# Patient Record
Sex: FEMALE | Race: WHITE | URBAN - METROPOLITAN AREA
[De-identification: names, ages, dates, MRNs, and addresses within clinical notes are randomized per-mention and may not be internally consistent; named-entity substitution may affect disease eponyms.]

---

## 2018-08-03 ENCOUNTER — OFFICE VISIT (OUTPATIENT)
Dept: FAMILY MEDICINE | Facility: CLINIC | Age: 63
End: 2018-08-03
Payer: COMMERCIAL

## 2018-08-03 VITALS
SYSTOLIC BLOOD PRESSURE: 141 MMHG | HEIGHT: 61 IN | BODY MASS INDEX: 30.35 KG/M2 | DIASTOLIC BLOOD PRESSURE: 92 MMHG | WEIGHT: 160.75 LBS | HEART RATE: 103 BPM | TEMPERATURE: 98.5 F

## 2018-08-03 DIAGNOSIS — Z12.39 BREAST CANCER SCREENING: ICD-10-CM

## 2018-08-03 DIAGNOSIS — Z12.11 COLON CANCER SCREENING: ICD-10-CM

## 2018-08-03 DIAGNOSIS — Z00.00 PREVENTATIVE HEALTH CARE: ICD-10-CM

## 2018-08-03 DIAGNOSIS — K21.9 GASTROESOPHAGEAL REFLUX DISEASE, ESOPHAGITIS PRESENCE NOT SPECIFIED: ICD-10-CM

## 2018-08-03 DIAGNOSIS — R10.13 ABDOMINAL PAIN, EPIGASTRIC: Primary | ICD-10-CM

## 2018-08-03 DIAGNOSIS — R03.0 ELEVATED BLOOD-PRESSURE READING WITHOUT DIAGNOSIS OF HYPERTENSION: ICD-10-CM

## 2018-08-03 LAB
ALBUMIN SERPL-MCNC: 4.4 G/DL (ref 3.4–5)
ALP SERPL-CCNC: 76 U/L (ref 40–150)
ALT SERPL W P-5'-P-CCNC: 34 U/L (ref 0–50)
ANION GAP SERPL CALCULATED.3IONS-SCNC: 8 MMOL/L (ref 3–14)
AST SERPL W P-5'-P-CCNC: 18 U/L (ref 0–45)
BILIRUB SERPL-MCNC: 0.4 MG/DL (ref 0.2–1.3)
BUN SERPL-MCNC: 12 MG/DL (ref 7–30)
CALCIUM SERPL-MCNC: 9.2 MG/DL (ref 8.5–10.1)
CHLORIDE SERPL-SCNC: 107 MMOL/L (ref 94–109)
CHOLEST SERPL-MCNC: 238 MG/DL
CO2 SERPL-SCNC: 25 MMOL/L (ref 20–32)
CREAT SERPL-MCNC: 0.6 MG/DL (ref 0.52–1.04)
ERYTHROCYTE [DISTWIDTH] IN BLOOD BY AUTOMATED COUNT: 12 % (ref 10–15)
GFR SERPL CREATININE-BSD FRML MDRD: >90 ML/MIN/1.7M2
GLUCOSE SERPL-MCNC: 108 MG/DL (ref 70–99)
HBA1C MFR BLD: 5.8 % (ref 0–5.6)
HCT VFR BLD AUTO: 42.5 % (ref 35–47)
HDLC SERPL-MCNC: 50 MG/DL
HGB BLD-MCNC: 14.7 G/DL (ref 11.7–15.7)
LDLC SERPL CALC-MCNC: 167 MG/DL
MCH RBC QN AUTO: 31.2 PG (ref 26.5–33)
MCHC RBC AUTO-ENTMCNC: 34.6 G/DL (ref 31.5–36.5)
MCV RBC AUTO: 90 FL (ref 78–100)
NONHDLC SERPL-MCNC: 188 MG/DL
PLATELET # BLD AUTO: 436 10E9/L (ref 150–450)
POTASSIUM SERPL-SCNC: 4.1 MMOL/L (ref 3.4–5.3)
PROT SERPL-MCNC: 8.2 G/DL (ref 6.8–8.8)
RBC # BLD AUTO: 4.71 10E12/L (ref 3.8–5.2)
SODIUM SERPL-SCNC: 140 MMOL/L (ref 133–144)
TRIGL SERPL-MCNC: 104 MG/DL
WBC # BLD AUTO: 5.5 10E9/L (ref 4–11)

## 2018-08-03 PROCEDURE — 36415 COLL VENOUS BLD VENIPUNCTURE: CPT | Performed by: FAMILY MEDICINE

## 2018-08-03 PROCEDURE — 85027 COMPLETE CBC AUTOMATED: CPT | Performed by: FAMILY MEDICINE

## 2018-08-03 PROCEDURE — 80061 LIPID PANEL: CPT | Performed by: FAMILY MEDICINE

## 2018-08-03 PROCEDURE — 99203 OFFICE O/P NEW LOW 30 MIN: CPT | Performed by: FAMILY MEDICINE

## 2018-08-03 PROCEDURE — 83036 HEMOGLOBIN GLYCOSYLATED A1C: CPT | Performed by: FAMILY MEDICINE

## 2018-08-03 PROCEDURE — 80053 COMPREHEN METABOLIC PANEL: CPT | Performed by: FAMILY MEDICINE

## 2018-08-03 RX ORDER — ACETAMINOPHEN 325 MG/1
325-650 TABLET ORAL EVERY 6 HOURS PRN
COMMUNITY

## 2018-08-03 RX ORDER — IBUPROFEN 200 MG
200 TABLET ORAL EVERY 4 HOURS PRN
COMMUNITY
End: 2020-10-23

## 2018-08-03 NOTE — PROGRESS NOTES
SUBJECTIVE:   Cynthia Cheatham is a 63 year old female who presents to clinic today for the following health issues:      ABD PAIN     Onset: 3 weeks    Description:   Character: Burning  Location: epigastric region  Radiation: Back    Intensity: 7/10    Progression of Symptoms:  improving and intermittent    Accompanying Signs & Symptoms:  Fever/Chills?: no   Gas/Bloating: YES  Nausea: no   Vomitting: no   Diarrhea?: no   Constipation:no   Dysuria or Hematuria: no    History:   Trauma: no   Previous similar pain: no    Previous tests done: none    Precipitating factors:   Does the pain change with:     Food: YES     BM: no     Urination: no     Alleviating factors:  TUMS    Therapies Tried and outcome: TUMS with minimal relief    LMP:  not applicable       She is a new patient to me and our clinic.  She has not had any health care for herself in years.  She has spent much of the 18 years caring for her parents.  Her father  of Parkinson's disease a while back and her mother just  in April of this year.    The patient has been reluctant to go to doctors for herself.  She has never had a Pap smear.  She is single and has not been sexually active for years.  She has had high blood pressure readings in the past when she has accessed any type of healthcare.  She has never had a mammogram or colonoscopy.  She has been having some burning midepigastric discomfort in the last couple of months or so.  It is worse with caffeine.  She was drinking 1-2 glasses of wine per day, but she has cut that back and her caffeine intake back and that seems to help.  She tried some over-the-counter H2 blockers, but they did not seem to help much.  She thought she felt a lump in the right upper abdomen a week or 2 ago, but she does not feel it now.  Her father has had gallstones.  He has had diabetes.  Both parents had high blood pressure.  Her brother has high blood pressure.    Problem list and histories reviewed & adjusted,  "as indicated.  Additional history: as documented    There is no problem list on file for this patient.    History reviewed. No pertinent surgical history.    Social History   Substance Use Topics     Smoking status: Never Smoker     Smokeless tobacco: Never Used     Alcohol use Yes      Comment: 7-14/ week, wine     History reviewed. No pertinent family history.      Current Outpatient Prescriptions   Medication Sig Dispense Refill     acetaminophen (TYLENOL) 325 MG tablet Take 325-650 mg by mouth every 6 hours as needed for mild pain       ibuprofen (ADVIL/MOTRIN) 200 MG tablet Take 200 mg by mouth every 4 hours as needed for mild pain         1     Allergies   Allergen Reactions     Penicillins        Reviewed and updated as needed this visit by clinical staff  Tobacco  Meds  Med Hx  Surg Hx  Fam Hx  Soc Hx      Reviewed and updated as needed this visit by Provider         ROS:  She admits to some \"binge eating\" at times where she will eat a lot of junk food.  Other times she will try to eat healthy.  She has not had nausea or poor appetite.    OBJECTIVE:     BP (!) 158/102 (BP Location: Right arm, Patient Position: Sitting, Cuff Size: Adult Regular)  Pulse 103  Temp 98.5  F (36.9  C) (Oral)  Ht 5' 0.75\" (1.543 m)  Wt 160 lb 12 oz (72.9 kg)  BMI 30.62 kg/m2  Body mass index is 30.62 kg/(m^2).  GENERAL: alert, no distress and over weight  ABDOMEN: soft, nontender, no hepatosplenomegaly, no masses and bowel sounds normal  PSYCH: mentation appears normal, tearful and anxious    Diagnostic Test Results:  none     ASSESSMENT/PLAN:       ICD-10-CM    1. Abdominal pain, epigastric R10.13 ibuprofen (ADVIL/MOTRIN) 200 MG tablet     acetaminophen (TYLENOL) 325 MG tablet     omeprazole (PRILOSEC) 20 MG CR capsule     Comprehensive metabolic panel   2. Gastroesophageal reflux disease, esophagitis presence not specified K21.9    3. Elevated blood-pressure reading without diagnosis of hypertension R03.0    4. Colon " cancer screening Z12.11 GASTROENTEROLOGY ADULT REF PROCEDURE ONLY Other; [facility] [phone]   5. Breast cancer screening Z12.31 MA Screening Digital Bilateral   6. Preventative health care Z00.00 Hemoglobin A1c     CBC with platelets     Lipid panel reflex to direct LDL Non-fasting     CANCELED: Lipid panel reflex to direct LDL Fasting     CANCELED: GASTROENTEROLOGY ADULT REF PROCEDURE ONLY Other     She has had some midepigastric discomfort which I suspect is related to gastritis and/or GERD  I recommended reduction or abstinence from caffeine and alcoholic beverages, hot spicy foods, etc.  We will have her take omeprazole 20 mg daily  We will check some lab work as above  She is fasting except for having had a small amount of grapefruit juice this morning  We will set her up for some screening tests including mammogram and colonoscopy  We will have her return next week for review of lab results and a general physical including Pap smear  We will readdress her blood pressure at that time and consider medication for that  Discussed possible gallbladder ultrasound if symptoms persist, or perhaps upper endoscopy as well    Albert Grijalva MD  [facility]

## 2018-08-03 NOTE — MR AVS SNAPSHOT
After Visit Summary   8/3/2018    Cynthia Cheatham    MRN: [id number]           Patient Information     Date Of Birth          1955        Visit Information        Provider Department      8/3/2018 10:00 AM Albert Grijalva MD [facility]        Today's Diagnoses     Abdominal pain, epigastric    -  1    Gastroesophageal reflux disease, esophagitis presence not specified        Elevated blood-pressure reading without diagnosis of hypertension        Colon cancer screening        Breast cancer screening        Preventative health care           Follow-ups after your visit        Additional Services     GASTROENTEROLOGY ADULT REF PROCEDURE ONLY Other; [facility] [phone]       Last Lab Result: No results found for: CR  Body mass index is 30.62 kg/(m^2).     Needed:  No  Language:  English    Patient will be contacted to schedule procedure.     Please be aware that coverage of these services is subject to the terms and limitations of your health insurance plan.  Call member services at your health plan with any benefit or coverage questions.  Any procedures must be performed at a [facility] facility OR coordinated by your clinic's referral office.    Please bring the following with you to your appointment:    (1) Any X-Rays, CTs or MRIs which have been performed.  Contact the facility where they were done to arrange for  prior to your scheduled appointment.    (2) List of current medications   (3) This referral request   (4) Any documents/labs given to you for this referral                  Follow-up notes from your care team     Return in about 5 days (around 8/8/2018).      Your next 10 appointments already scheduled     Aug 09, 2018  2:20 PM CDT   PHYSICAL with Albert Grijalva MD   [facility]    [address] [facility] [address] [phone]            Aug 13, 2018 10:00  "AM CDT   MA SCREENING DIGITAL BILATERAL with CPMA1   [facility] ([facility])    [address]  [facility] [address] [phone]           Do not use any powder, lotion or deodorant under your arms or on your breast. If you do, we will ask you to remove it before your exam.  Wear comfortable, two-piece clothing.  If you have any allergies, tell your care team.  Bring any previous mammograms from other facilities or have them mailed to the breast center.              Future tests that were ordered for you today     Open Future Orders        Priority Expected Expires Ordered    MA Screening Digital Bilateral Routine  8/3/2019 8/3/2018            Who to contact     If you have questions or need follow up information about today's clinic visit or your schedule please contact [facility] directly at [phone].  Normal or non-critical lab and imaging results will be communicated to you by MyChart, letter or phone within 4 business days after the clinic has received the results. If you do not hear from us within 7 days, please contact the clinic through ams AGhart or phone. If you have a critical or abnormal lab result, we will notify you by phone as soon as possible.  Submit refill requests through UpNext or call your pharmacy and they will forward the refill request to us. Please allow 3 business days for your refill to be completed.          Additional Information About Your Visit        MyChart Information     UpNext lets you send messages to your doctor, view your test results, renew your prescriptions, schedule appointments and more. To sign up, go to [email or web address] . Click on \"Log in\" on the left side of the screen, which will take you to the Welcome page. Then click on \"Sign up Now\" on the right side of the page.     You will be asked to enter the access code listed below, as well as some personal information. Please " "follow the directions to create your username and password.     Your access code is: [email or web address]  Expires: 2018 10:40 AM     Your access code will  in 90 days. If you need help or a new code, please call your [place] clinic or [phone].        Care EveryWhere ID     This is your Care EveryWhere ID. This could be used by other organizations to access your [place] medical records  [id number]        Your Vitals Were     Pulse Temperature Height BMI (Body Mass Index)          103 98.5  F (36.9  C) (Oral) 5' 0.75\" (1.543 m) 30.62 kg/m2         Blood Pressure from Last 3 Encounters:   18 (!) 158/102    Weight from Last 3 Encounters:   18 160 lb 12 oz (72.9 kg)              We Performed the Following     CBC with platelets     Comprehensive metabolic panel     GASTROENTEROLOGY ADULT REF PROCEDURE ONLY Other; [facility] [phone]     Hemoglobin A1c     Lipid panel reflex to direct LDL Non-fasting          Today's Medication Changes          These changes are accurate as of 8/3/18 10:50 AM.  If you have any questions, ask your nurse or doctor.               Start taking these medicines.        Dose/Directions    omeprazole 20 MG CR capsule   Commonly known as:  priLOSEC   Used for:  Abdominal pain, epigastric   Started by:  Albert Grijalva MD        Dose:  20 mg   Take 1 capsule (20 mg) by mouth daily a half hour before a meal   Quantity:  30 capsule   Refills:  1            Where to get your medicines      These medications were sent to [id number] - [place], [place] - [address] AT [facility] [address], [place] [address]     Phone:  [phone]     omeprazole 20 MG CR capsule                Primary Care Provider Fax #    Physician No Ref-Primary [phone]       No address on file        Equal Access to Services     DENIZ GUERRERO AH: Aureliano Ortega, waaxda luqadaha, qaybta kaalmada adeegyada, moustapha merrill " ash gamingwongrabia cifuentes'aamalachi ah. So [facility] [place] [phone].    ATENCIÓN: Si habla pedro pablo, tiene a garay disposición servicios gratuitos de asistencia lingüística. [facility] [phone].    We comply with applicable federal civil rights laws and [place] laws. We do not discriminate on the basis of race, color, national origin, age, disability, sex, sexual orientation, or gender identity.            Thank you!     Thank you for choosing [facility]  for your care. Our goal is always to provide you with excellent care. Hearing back from our patients is one way we can continue to improve our services. Please take a few minutes to complete the written survey that you may receive in the mail after your visit with us. Thank you!             Your Updated Medication List - Protect others around you: Learn how to safely use, store and throw away your medicines at www.disposemymeds.org.          This list is accurate as of 8/3/18 10:50 AM.  Always use your most recent med list.                   Brand Name Dispense Instructions for use Diagnosis    ibuprofen 200 MG tablet    ADVIL/MOTRIN     Take 200 mg by mouth every 4 hours as needed for mild pain    Abdominal pain, epigastric       omeprazole 20 MG CR capsule    priLOSEC    30 capsule    Take 1 capsule (20 mg) by mouth daily a half hour before a meal    Abdominal pain, epigastric       TYLENOL 325 MG tablet   Generic drug:  acetaminophen      Take 325-650 mg by mouth every 6 hours as needed for mild pain    Abdominal pain, epigastric

## 2018-08-06 ENCOUNTER — TELEPHONE (OUTPATIENT)
Dept: FAMILY MEDICINE | Facility: CLINIC | Age: 63
End: 2018-08-06

## 2018-08-06 NOTE — TELEPHONE ENCOUNTER
Reason for Call:  Other / [facility] has not received the Referral for a colonoscopy    Detailed comments: Patient called and stated that the Referral for a colonoscopy that was placed online has not been received by [facility].  Patient also stated that she spoke to [facility] and they said that the best and soonest way for them to receive it is to have it done again online or phone it in. Please call patient back if further information is required.    Phone Number Patient can be reached at: [phone]    Best Time: Anytime    Can we leave a detailed message on this number? YES    Call taken on 8/6/2018 at 4:25 PM by Ani Keys

## 2018-08-09 ENCOUNTER — RADIANT APPOINTMENT (OUTPATIENT)
Dept: GENERAL RADIOLOGY | Facility: CLINIC | Age: 63
End: 2018-08-09
Attending: FAMILY MEDICINE
Payer: COMMERCIAL

## 2018-08-09 ENCOUNTER — OFFICE VISIT (OUTPATIENT)
Dept: FAMILY MEDICINE | Facility: CLINIC | Age: 63
End: 2018-08-09
Payer: COMMERCIAL

## 2018-08-09 VITALS
HEIGHT: 61 IN | BODY MASS INDEX: 30.4 KG/M2 | OXYGEN SATURATION: 96 % | SYSTOLIC BLOOD PRESSURE: 153 MMHG | TEMPERATURE: 98 F | HEART RATE: 89 BPM | DIASTOLIC BLOOD PRESSURE: 88 MMHG | WEIGHT: 161 LBS

## 2018-08-09 DIAGNOSIS — E78.5 HYPERLIPIDEMIA LDL GOAL <130: ICD-10-CM

## 2018-08-09 DIAGNOSIS — M25.551 HIP PAIN, RIGHT: ICD-10-CM

## 2018-08-09 DIAGNOSIS — R73.01 IMPAIRED FASTING GLUCOSE: ICD-10-CM

## 2018-08-09 DIAGNOSIS — Z12.4 SCREENING FOR MALIGNANT NEOPLASM OF CERVIX: ICD-10-CM

## 2018-08-09 DIAGNOSIS — Z00.00 ROUTINE GENERAL MEDICAL EXAMINATION AT A HEALTH CARE FACILITY: Primary | ICD-10-CM

## 2018-08-09 DIAGNOSIS — I10 HYPERTENSION WITH GOAL BLOOD PRESSURE LESS THAN 140/90: ICD-10-CM

## 2018-08-09 DIAGNOSIS — Z23 NEED FOR PROPHYLACTIC VACCINATION WITH TETANUS-DIPHTHERIA (TD): ICD-10-CM

## 2018-08-09 PROCEDURE — 90715 TDAP VACCINE 7 YRS/> IM: CPT | Performed by: FAMILY MEDICINE

## 2018-08-09 PROCEDURE — 87624 HPV HI-RISK TYP POOLED RSLT: CPT | Performed by: FAMILY MEDICINE

## 2018-08-09 PROCEDURE — 73502 X-RAY EXAM HIP UNI 2-3 VIEWS: CPT | Mod: FY

## 2018-08-09 PROCEDURE — 90471 IMMUNIZATION ADMIN: CPT | Performed by: FAMILY MEDICINE

## 2018-08-09 PROCEDURE — 99396 PREV VISIT EST AGE 40-64: CPT | Mod: 25 | Performed by: FAMILY MEDICINE

## 2018-08-09 PROCEDURE — 88175 CYTOPATH C/V AUTO FLUID REDO: CPT | Performed by: FAMILY MEDICINE

## 2018-08-09 PROCEDURE — 99213 OFFICE O/P EST LOW 20 MIN: CPT | Mod: 25 | Performed by: FAMILY MEDICINE

## 2018-08-09 RX ORDER — AMLODIPINE BESYLATE 5 MG/1
5 TABLET ORAL DAILY
Qty: 30 TABLET | Refills: 1 | Status: SHIPPED | OUTPATIENT
Start: 2018-08-09 | End: 2018-09-13

## 2018-08-09 ASSESSMENT — ENCOUNTER SYMPTOMS
NAUSEA: 0
MYALGIAS: 0
ARTHRALGIAS: 1
CHILLS: 0
DIARRHEA: 0
EYE PAIN: 0
WEAKNESS: 0
JOINT SWELLING: 0
COUGH: 0
DIZZINESS: 0
HEMATURIA: 0
PALPITATIONS: 0
SHORTNESS OF BREATH: 0
PARESTHESIAS: 0
FREQUENCY: 0
HEARTBURN: 1
HEMATOCHEZIA: 0
HEADACHES: 1
DYSURIA: 0
CONSTIPATION: 0
SORE THROAT: 0
BREAST MASS: 0
NERVOUS/ANXIOUS: 0
FEVER: 0
ABDOMINAL PAIN: 0

## 2018-08-09 NOTE — MR AVS SNAPSHOT
After Visit Summary   8/9/2018    Cynthia Cheatham    MRN: [id number]           Patient Information     Date Of Birth          1955        Visit Information        Provider Department      8/9/2018 2:20 PM Albert Grijalva MD [facility]        Today's Diagnoses     Routine general medical examination at a health care facility    -  1    Hip pain, right        Hypertension with goal blood pressure less than 140/90        Impaired fasting glucose        Need for prophylactic vaccination with tetanus-diphtheria (TD)        Hyperlipidemia LDL goal <130        Screening for malignant neoplasm of cervix          Care Instructions      Preventive Health Recommendations  Female Ages 50 - 64    Yearly exam: See your health care provider every year in order to  o Review health changes.   o Discuss preventive care.    o Review your medicines if your doctor has prescribed any.      Get a Pap test every three years (unless you have an abnormal result and your provider advises testing more often).    If you get Pap tests with HPV test, you only need to test every 5 years, unless you have an abnormal result.     You do not need a Pap test if your uterus was removed (hysterectomy) and you have not had cancer.    You should be tested each year for STDs (sexually transmitted diseases) if you're at risk.     Have a mammogram every 1 to 2 years.    Have a colonoscopy at age 50, or have a yearly FIT test (stool test). These exams screen for colon cancer.      Have a cholesterol test every 5 years, or more often if advised.    Have a diabetes test (fasting glucose) every three years. If you are at risk for diabetes, you should have this test more often.     If you are at risk for osteoporosis (brittle bone disease), think about having a bone density scan (DEXA).    Shots: Get a flu shot each year. Get a tetanus shot every 10 years.    Nutrition:     Eat at least 5 servings of fruits and  vegetables each day.    Eat whole-grain bread, whole-wheat pasta and brown rice instead of white grains and rice.    Get adequate Calcium and Vitamin D.     Lifestyle    Exercise at least 150 minutes a week (30 minutes a day, 5 days a week). This will help you control your weight and prevent disease.    Limit alcohol to one drink per day.    No smoking.     Wear sunscreen to prevent skin cancer.     See your dentist every six months for an exam and cleaning.    See your eye doctor every 1 to 2 years.            Follow-ups after your visit        Follow-up notes from your care team     Return in about 4 weeks (around 9/6/2018) for BP Recheck, follow up on tests.      Your next 10 appointments already scheduled     Aug 13, 2018 10:00 AM CDT   MA SCREENING DIGITAL BILATERAL with CPMA1   [facility]    [address] [facility] [address] [phone]           Do not use any powder, lotion or deodorant under your arms or on your breast. If you do, we will ask you to remove it before your exam.  Wear comfortable, two-piece clothing.  If you have any allergies, tell your care team.  Bring any previous mammograms from other facilities or have them mailed to the breast center.              Who to contact     If you have questions or need follow up information about today's clinic visit or your schedule please contact [facility] directly at [phone].  Normal or non-critical lab and imaging results will be communicated to you by MyChart, letter or phone within 4 business days after the clinic has received the results. If you do not hear from us within 7 days, please contact the clinic through MyChart or phone. If you have a critical or abnormal lab result, we will notify you by phone as soon as possible.  Submit refill requests through Holvi or call your pharmacy and they will forward the refill request to us. Please allow  "3 business days for your refill to be completed.          Additional Information About Your Visit        MyChart Information     Growt lets you send messages to your doctor, view your test results, renew your prescriptions, schedule appointments and more. To sign up, go to [email or web address] . Click on \"Log in\" on the left side of the screen, which will take you to the Welcome page. Then click on \"Sign up Now\" on the right side of the page.     You will be asked to enter the access code listed below, as well as some personal information. Please follow the directions to create your username and password.     Your access code is: [email or web address]  Expires: 2018 10:40 AM     Your access code will  in 90 days. If you need help or a new code, please call your [place] clinic or [phone].        Care EveryWhere ID     This is your Care EveryWhere ID. This could be used by other organizations to access your [place] medical records  [id number]        Your Vitals Were     Pulse Temperature Height Pulse Oximetry BMI (Body Mass Index)       89 98  F (36.7  C) (Oral) 5' 0.5\" (1.537 m) 96% 30.93 kg/m2        Blood Pressure from Last 3 Encounters:   18 153/88   18 (!) 141/92    Weight from Last 3 Encounters:   18 161 lb (73 kg)   18 160 lb 12 oz (72.9 kg)              We Performed the Following          ADMIN VACCINE, FIRST     HPV High Risk Types DNA Cervical     Pap imaged thin layer diagnostic with HPV (select HPV order below)     TDAP VACCINE (ADACEL)     XR Hip Right 2-3 Views          Today's Medication Changes          These changes are accurate as of 18  3:18 PM.  If you have any questions, ask your nurse or doctor.               Start taking these medicines.        Dose/Directions    amLODIPine 5 MG tablet   Commonly known as:  NORVASC   Used for:  Hypertension with goal blood pressure less than 140/90   Started by:  Albert Grijalva MD        Dose:  5 mg   Take 1 tablet " (5 mg) by mouth daily   Quantity:  30 tablet   Refills:  1            Where to get your medicines      These medications were sent to [id number] - [place], [place] - [address] [facility] [facility] [facility] [address], [place] [address]     Phone:  [phone]     amLODIPine 5 MG tablet                Primary Care Provider Fax #    Physician No Ref-Primary [phone]       No address on file        Equal Access to Services     DENIZ GUERRERO : Hadii aad ku hadasho Soomaali, waaxda luqadaha, qaybta kaalmada adeegyada, waxay idiin hayaan adeeg susana garcia. So [facility] [phone].    ATENCIÓN: Si habla español, tiene a garay disposición servicios gratuitos de asistencia lingüística. [facility] [phone].    We comply with applicable federal civil rights laws and [place] laws. We do not discriminate on the basis of race, color, national origin, age, disability, sex, sexual orientation, or gender identity.            Thank you!     Thank you for choosing [facility]  for your care. Our goal is always to provide you with excellent care. Hearing back from our patients is one way we can continue to improve our services. Please take a few minutes to complete the written survey that you may receive in the mail after your visit with us. Thank you!             Your Updated Medication List - Protect others around you: Learn how to safely use, store and throw away your medicines at www.disposemymeds.org.          This list is accurate as of 8/9/18  3:18 PM.  Always use your most recent med list.                   Brand Name Dispense Instructions for use Diagnosis    amLODIPine 5 MG tablet    NORVASC    30 tablet    Take 1 tablet (5 mg) by mouth daily    Hypertension with goal blood pressure less than 140/90       ibuprofen 200 MG tablet    ADVIL/MOTRIN     Take 200 mg by mouth every 4 hours as needed for mild pain    Abdominal pain, epigastric        omeprazole 20 MG CR capsule    priLOSEC    30 capsule    Take 1 capsule (20 mg) by mouth daily a half hour before a meal    Abdominal pain, epigastric       TYLENOL 325 MG tablet   Generic drug:  acetaminophen      Take 325-650 mg by mouth every 6 hours as needed for mild pain    Abdominal pain, epigastric

## 2018-08-09 NOTE — LETTER
August 17, 2018    Cynthia Cheatham  [address] [facility] [address]    Dear Cynthia,  We are happy to inform you that your PAP smear result from 8/9/18 is normal.  We are now able to do a follow up test on PAP smears. The DNA test is for HPV (Human Papilloma Virus). Cervical cancer is closely linked with certain types of HPV. Your results showed no evidence of high risk HPV.  Therefore we recommend you return in 5 years for your next pap smear and HPV test.  You will still need to return to the clinic every year for an annual exam and other preventive tests.  Please contact the clinic at [phone] with any questions.  Sincerely,    Albert Grijalva MD/tatiana

## 2018-08-09 NOTE — NURSING NOTE
Screening Questionnaire for Adult Immunization    Are you sick today?   No   Do you have allergies to medications, food, a vaccine component or latex?   No   Have you ever had a serious reaction after receiving a vaccination?   No   Do you have a long-term health problem with heart disease, lung disease, asthma, kidney disease, metabolic disease (e.g. diabetes), anemia, or other blood disorder?   No   Do you have cancer, leukemia, HIV/AIDS, or any other immune system problem?   No   In the past 3 months, have you taken medications that affect  your immune system, such as prednisone, other steroids, or anticancer drugs; drugs for the treatment of rheumatoid arthritis, Crohn s disease, or psoriasis; or have you had radiation treatments?   No   Have you had a seizure, or a brain or other nervous system problem?   No   During the past year, have you received a transfusion of blood or blood     products, or been given immune (gamma) globulin or antiviral drug?   No   For women: Are you pregnant or is there a chance you could become        pregnant during the next month?   No   Have you received any vaccinations in the past 4 weeks?   No     Immunization questionnaire answers were all negative.        Patient instructed to remain in clinic for 15 minutes afterwards, and to report any adverse reaction to me immediately.  VIS for Tdap given on same date of administration.         Screening performed by Julieth Pineda on 8/9/2018 at 3:22 PM.

## 2018-08-09 NOTE — PROGRESS NOTES
SUBJECTIVE:   CC: Cynthia Cheatham is an 63 year old woman who presents for a preventive health visit and to address some underlying health conditions.  I had just seen her last week, which was the first healthcare visit she had had in a long time.  See previous note for details on this.  She does have a colonoscopy set up for later this month on August 31.  She has a mammogram set up for this coming Monday, August 13.    Physical   Annual:     Getting at least 3 servings of Calcium per day:  Yes    Bi-annual eye exam:  NO    Dental care twice a year:  NO    Sleep apnea or symptoms of sleep apnea:  Daytime drowsiness    Diet:  Regular (no restrictions)    Frequency of exercise:  None    Taking medications regularly:  Yes    Medication side effects:  Not applicable    Additional concerns today:  YES    Other concerns:     Joint or Musculoskeletal Pain  Duration of complaint: Right hip, hasn't been able to walk for awhile. Some days it feels like it is out of joint.  She has had right hip pain for years.  No specific injury.  She used to walk a fair amount, but has not been doing that in the last year or 2 because of the right hip pain.    She does feel like her abdominal discomfort is somewhat better. She is taking omeprazole now.    Today's PHQ-2 Score:   PHQ-2 ( 1999 Pfizer) 8/9/2018   Q1: Little interest or pleasure in doing things 0   Q2: Feeling down, depressed or hopeless 0   PHQ-2 Score 0   Q1: Little interest or pleasure in doing things Not at all   Q2: Feeling down, depressed or hopeless Not at all   PHQ-2 Score 0       Abuse: Current or Past(Physical, Sexual or Emotional)- No  Do you feel safe in your environment - Yes    Social History   Substance Use Topics     Smoking status: Never Smoker     Smokeless tobacco: Never Used     Alcohol use Yes      Comment: 7-14/ week, wine     Alcohol Use 8/9/2018   If you drink alcohol do you typically have greater than 3 drinks per day OR greater than 7 drinks per  week? No       Reviewed orders with patient.  Reviewed health maintenance and updated orders accordingly - Yes  Patient Active Problem List   Diagnosis     Hyperlipidemia LDL goal <130     Impaired fasting glucose     History reviewed. No pertinent surgical history.    Social History   Substance Use Topics     Smoking status: Never Smoker     Smokeless tobacco: Never Used     Alcohol use Yes      Comment: 7-14/ week, wine     Family History   Problem Relation Age of Onset     Prostate Cancer Father      Hyperlipidemia Brother      Prostate Cancer Brother          Current Outpatient Prescriptions   Medication Sig Dispense Refill     acetaminophen (TYLENOL) 325 MG tablet Take 325-650 mg by mouth every 6 hours as needed for mild pain       ibuprofen (ADVIL/MOTRIN) 200 MG tablet Take 200 mg by mouth every 4 hours as needed for mild pain       omeprazole (PRILOSEC) 20 MG CR capsule Take 1 capsule (20 mg) by mouth daily a half hour before a meal 30 capsule 1     Allergies   Allergen Reactions     Penicillins        Patient over age 50, mutual decision to screen reflected in health maintenance.    Pertinent mammograms are reviewed under the imaging tab.  History of abnormal Pap smear: NO - age 30-65 PAP every 5 years with negative HPV co-testing recommended     Reviewed and updated as needed this visit by clinical staff  Tobacco  Allergies  Meds  Med Hx  Surg Hx  Fam Hx  Soc Hx        Reviewed and updated as needed this visit by Provider            Review of Systems   Constitutional: Negative for chills and fever.   HENT: Negative for congestion, ear pain, hearing loss and sore throat.    Eyes: Negative for pain and visual disturbance.   Respiratory: Negative for cough and shortness of breath.    Cardiovascular: Negative for chest pain, palpitations and peripheral edema.   Gastrointestinal: Positive for heartburn. Negative for abdominal pain, constipation, diarrhea, hematochezia and nausea.   Breasts:  Negative for  "tenderness, breast mass and discharge.   Genitourinary: Negative for dysuria, frequency, genital sores, hematuria, pelvic pain, urgency, vaginal bleeding and vaginal discharge.   Musculoskeletal: Positive for arthralgias. Negative for joint swelling and myalgias.   Skin: Negative for rash.   Neurological: Positive for headaches. Negative for dizziness, weakness and paresthesias.   Psychiatric/Behavioral: Negative for mood changes. The patient is not nervous/anxious.           OBJECTIVE:   /88 (BP Location: Left arm, Patient Position: Chair, Cuff Size: Adult Regular)  Pulse 89  Temp 98  F (36.7  C) (Oral)  Ht 5' 0.5\" (1.537 m)  Wt 161 lb (73 kg)  SpO2 96%  BMI 30.93 kg/m2  Physical Exam  GENERAL: alert, no distress and over weight  EYES: Eyes grossly normal to inspection, PERRL and conjunctivae and sclerae normal  HENT: ear canals and TM's normal, nose and mouth without ulcers or lesions  NECK: no adenopathy, no asymmetry, masses, or scars and thyroid normal to palpation  RESP: lungs clear to auscultation - no rales, rhonchi or wheezes  BREAST: normal without masses, tenderness or nipple discharge and no palpable axillary masses or adenopathy  CV: regular rate and rhythm, normal S1 S2, no S3 or S4, no murmur, click or rub, no peripheral edema and peripheral pulses strong  ABDOMEN: soft, nontender, no hepatosplenomegaly, no masses    (female): normal female external genitalia, normal urethral meatus, vaginal mucosa pink, moist, well rugated, and normal cervix/adnexa/uterus without masses or discharge  MS: She has no specific pain to palpation over the lateral right hip or the posterior right hip.  She does have pain in the hip joint with range of motion of the right hip, especially internal rotation and external rotation.  SKIN: no suspicious lesions or rashes  NEURO: Normal strength and tone, mentation intact and speech normal  PSYCH: mentation appears normal, affect normal/bright    Diagnostic Test " Results:  Office Visit on 08/03/2018   Component Date Value Ref Range Status     Sodium 08/03/2018 140  133 - 144 mmol/L Final     Potassium 08/03/2018 4.1  3.4 - 5.3 mmol/L Final     Chloride 08/03/2018 107  94 - 109 mmol/L Final     Carbon Dioxide 08/03/2018 25  20 - 32 mmol/L Final     Anion Gap 08/03/2018 8  3 - 14 mmol/L Final     Glucose 08/03/2018 108* 70 - 99 mg/dL Final    Non Fasting     Urea Nitrogen 08/03/2018 12  7 - 30 mg/dL Final     Creatinine 08/03/2018 0.60  0.52 - 1.04 mg/dL Final     GFR Estimate 08/03/2018 >90  >60 mL/min/1.7m2 Final    Non  GFR Calc     GFR Estimate If Black 08/03/2018 >90  >60 mL/min/1.7m2 Final    African American GFR Calc     Calcium 08/03/2018 9.2  8.5 - 10.1 mg/dL Final     Bilirubin Total 08/03/2018 0.4  0.2 - 1.3 mg/dL Final     Albumin 08/03/2018 4.4  3.4 - 5.0 g/dL Final     Protein Total 08/03/2018 8.2  6.8 - 8.8 g/dL Final     Alkaline Phosphatase 08/03/2018 76  40 - 150 U/L Final     ALT 08/03/2018 34  0 - 50 U/L Final     AST 08/03/2018 18  0 - 45 U/L Final     Hemoglobin A1C 08/03/2018 5.8* 0 - 5.6 % Final    Comment: Normal <5.7% Prediabetes 5.7-6.4%  Diabetes 6.5% or higher - adopted from ADA   consensus guidelines.       WBC 08/03/2018 5.5  4.0 - 11.0 10e9/L Final     RBC Count 08/03/2018 4.71  3.8 - 5.2 10e12/L Final     Hemoglobin 08/03/2018 14.7  11.7 - 15.7 g/dL Final     Hematocrit 08/03/2018 42.5  35.0 - 47.0 % Final     MCV 08/03/2018 90  78 - 100 fl Final     MCH 08/03/2018 31.2  26.5 - 33.0 pg Final     MCHC 08/03/2018 34.6  31.5 - 36.5 g/dL Final     RDW 08/03/2018 12.0  10.0 - 15.0 % Final     Platelet Count 08/03/2018 436  150 - 450 10e9/L Final     Cholesterol 08/03/2018 238* <200 mg/dL Final    Desirable:       <200 mg/dl     Triglycerides 08/03/2018 104  <150 mg/dL Final    Non Fasting     HDL Cholesterol 08/03/2018 50  >49 mg/dL Final     LDL Cholesterol Calculated 08/03/2018 167* <100 mg/dL Final    Comment: Above desirable:   100-129 mg/dl  Borderline High:  130-159 mg/dL  High:             160-189 mg/dL  Very high:       >189 mg/dl       Non HDL Cholesterol 08/03/2018 188* <130 mg/dL Final    Comment: Above Desirable:  130-159 mg/dl  Borderline high:  160-189 mg/dl  High:             190-219 mg/dl  Very high:       >219 mg/dl       The 10-year ASCVD risk score (Dima GUILLAUME Jr, et al., 2013) is: 6.2%    Values used to calculate the score:      Age: 63 years      Sex: Female      Is Non- : No      Diabetic: No      Tobacco smoker: No      Systolic Blood Pressure: 153 mmHg      Is BP treated: No      HDL Cholesterol: 50 mg/dL      Total Cholesterol: 238 mg/dL      ASSESSMENT/PLAN:       ICD-10-CM    1. Routine general medical examination at a health care facility Z00.00    2. Hip pain, right M25.551 XR Hip Right 2-3 Views   3. Hypertension with goal blood pressure less than 140/90 I10 amLODIPine (NORVASC) 5 MG tablet   4. Impaired fasting glucose R73.01    5. Need for prophylactic vaccination with tetanus-diphtheria (TD) Z23 TDAP VACCINE (ADACEL)          ADMIN VACCINE, FIRST   6. Hyperlipidemia LDL goal <130 E78.5    7. Screening for malignant neoplasm of cervix Z12.4 Pap imaged thin layer diagnostic with HPV (select HPV order below)     HPV High Risk Types DNA Cervical     We discussed the above items   Her blood pressure remains elevated, so we will start her on amlodipine 5 mg daily for that  I encouraged her on diet and exercise treatment for her lipids and glucose and blood pressure  She likely has right hip osteoarthritis  We will check an x-ray for her today on her way out and then review those results with her at her next visit  I advised her to stay on the omeprazole for a month  Follow through with mammogram and colonoscopy as scheduled  We will give her a tetanus booster today  We talked about the new shingles vaccine, but decided to hold on that for now  Plan a recheck in about 4 weeks on the blood  "pressure, abdominal pain, and right hip    COUNSELING:  Reviewed preventive health counseling, as reflected in patient instructions       Regular exercise       Healthy diet/nutrition       Immunizations    Vaccinated for: TDAP          BP Readings from Last 1 Encounters:   08/03/18 (!) 141/92     Estimated body mass index is 30.62 kg/(m^2) as calculated from the following:    Height as of 8/3/18: 5' 0.75\" (1.543 m).    Weight as of 8/3/18: 160 lb 12 oz (72.9 kg).           reports that she has never smoked. She has never used smokeless tobacco.      Counseling Resources:  ATP IV Guidelines  Pooled Cohorts Equation Calculator  Breast Cancer Risk Calculator  FRAX Risk Assessment  ICSI Preventive Guidelines  Dietary Guidelines for Americans, 2010  USDA's MyPlate  ASA Prophylaxis  Lung CA Screening    Albert Grijalva MD  [facility]      Answers for HPI/ROS submitted by the patient on 8/9/2018   PHQ-2 Score: 0    "

## 2018-08-13 ENCOUNTER — RADIANT APPOINTMENT (OUTPATIENT)
Dept: MAMMOGRAPHY | Facility: CLINIC | Age: 63
End: 2018-08-13
Payer: COMMERCIAL

## 2018-08-13 DIAGNOSIS — Z12.39 BREAST CANCER SCREENING: ICD-10-CM

## 2018-08-13 LAB
COPATH REPORT: NORMAL
PAP: NORMAL

## 2018-08-13 PROCEDURE — 77067 SCR MAMMO BI INCL CAD: CPT | Mod: TC

## 2018-08-15 LAB
FINAL DIAGNOSIS: NORMAL
HPV HR 12 DNA CVX QL NAA+PROBE: NEGATIVE
HPV16 DNA SPEC QL NAA+PROBE: NEGATIVE
HPV18 DNA SPEC QL NAA+PROBE: NEGATIVE
SPECIMEN DESCRIPTION: NORMAL
SPECIMEN SOURCE CVX/VAG CYTO: NORMAL

## 2018-08-31 ENCOUNTER — TRANSFERRED RECORDS (OUTPATIENT)
Dept: HEALTH INFORMATION MANAGEMENT | Facility: CLINIC | Age: 63
End: 2018-08-31

## 2018-09-13 ENCOUNTER — OFFICE VISIT (OUTPATIENT)
Dept: FAMILY MEDICINE | Facility: CLINIC | Age: 63
End: 2018-09-13
Payer: COMMERCIAL

## 2018-09-13 VITALS
WEIGHT: 159 LBS | SYSTOLIC BLOOD PRESSURE: 144 MMHG | DIASTOLIC BLOOD PRESSURE: 85 MMHG | BODY MASS INDEX: 30.54 KG/M2 | OXYGEN SATURATION: 97 % | TEMPERATURE: 98.6 F | HEART RATE: 80 BPM

## 2018-09-13 DIAGNOSIS — M16.11 PRIMARY OSTEOARTHRITIS OF RIGHT HIP: ICD-10-CM

## 2018-09-13 DIAGNOSIS — R10.13 ABDOMINAL PAIN, EPIGASTRIC: ICD-10-CM

## 2018-09-13 DIAGNOSIS — I10 HYPERTENSION WITH GOAL BLOOD PRESSURE LESS THAN 140/90: Primary | ICD-10-CM

## 2018-09-13 PROCEDURE — 99214 OFFICE O/P EST MOD 30 MIN: CPT | Performed by: FAMILY MEDICINE

## 2018-09-13 RX ORDER — AMLODIPINE BESYLATE 5 MG/1
5 TABLET ORAL DAILY
Qty: 90 TABLET | Refills: 1 | Status: SHIPPED | OUTPATIENT
Start: 2018-09-13 | End: 2019-07-03

## 2018-09-13 NOTE — PROGRESS NOTES
SUBJECTIVE:   Cynthia Cheatham is a 63 year old female who presents to clinic today for the following health issues:      Hypertension Follow-up      Outpatient blood pressures are not being checked.    Low Salt Diet: no added salt      Amount of exercise or physical activity: None    Problems taking medications regularly: No    Medication side effects: Skipping of heartbeat, stopped the omeprazole everyday    Diet: regular (no restrictions)      Xray of hip, colonoscopy.    She has been on the amlodipine now for about a month.  She seems to be tolerating it well.  She gets occasional ankle swelling, but is not very bothersome.  Her abdominal discomfort is much improved.  She is not taking the omeprazole routinely anymore.  She does still have some ongoing right hip pain.  Some days are worse than others.  She generally takes over-the-counter Advil and/or acetaminophen for that.  She also underwent her colonoscopy and had a normal result.  There is some question about when her next colonoscopy should be.  She states that her brother had some polyps so apparently she was advised to have a repeat exam in 5 years.  The patient states that her brother was told that he could have a repeat exam in 10 years, however, so I wonder if those polyps may have been hyperplastic rather than adenomatous.    Problem list and histories reviewed & adjusted, as indicated.  Additional history: as documented    Patient Active Problem List   Diagnosis     Hyperlipidemia LDL goal <130     Impaired fasting glucose     Hypertension with goal blood pressure less than 140/90     Primary osteoarthritis of right hip     History reviewed. No pertinent surgical history.    Social History   Substance Use Topics     Smoking status: Never Smoker     Smokeless tobacco: Never Used     Alcohol use Yes      Comment: 7-14/ week, wine     Family History   Problem Relation Age of Onset     Prostate Cancer Father      Hyperlipidemia Brother      Prostate  Cancer Brother          Current Outpatient Prescriptions   Medication Sig Dispense Refill     acetaminophen (TYLENOL) 325 MG tablet Take 325-650 mg by mouth every 6 hours as needed for mild pain       amLODIPine (NORVASC) 5 MG tablet Take 1 tablet (5 mg) by mouth daily 90 tablet 1     ibuprofen (ADVIL/MOTRIN) 200 MG tablet Take 200 mg by mouth every 4 hours as needed for mild pain       omeprazole (PRILOSEC) 20 MG CR capsule Take 1 capsule (20 mg) by mouth daily a half hour before a meal (Patient taking differently: Take 20 mg by mouth daily a half hour before a meal/Taking PRN) 30 capsule 1     [DISCONTINUED] amLODIPine (NORVASC) 5 MG tablet Take 1 tablet (5 mg) by mouth daily 30 tablet 1     Allergies   Allergen Reactions     Penicillins        Reviewed and updated as needed this visit by clinical staff  Tobacco  Allergies  Meds  Med Hx  Surg Hx  Fam Hx  Soc Hx      Reviewed and updated as needed this visit by Provider         ROS:  Constitutional, HEENT, cardiovascular, pulmonary, gi and gu systems are negative, except as otherwise noted.    OBJECTIVE:     /85 (BP Location: Left arm, Patient Position: Chair, Cuff Size: Adult Regular)  Pulse 80  Temp 98.6  F (37  C) (Oral)  Wt 159 lb (72.1 kg)  SpO2 97%  BMI 30.54 kg/m2  Body mass index is 30.54 kg/(m^2).  GENERAL: healthy, alert and no distress  MS: Pain in the right hip with range of motion    Diagnostic Test Results:  Results for orders placed or performed in visit on 08/13/18   MA Screening Digital Bilateral    Narrative    SCREENING MAMMOGRAM, BILATERAL, DIGITAL w/CAD - 8/13/2018 10:05 AM    BREAST SYMPTOMS: No current breast complaints.     COMPARISON:  Baseline.    BREAST DENSITY: Scattered fibroglandular densities.    COMMENTS: No findings of suspicion for malignancy.       Impression    IMPRESSION: BI-RADS CATEGORY: 1 -  Negative    RECOMMENDED FOLLOW-UP: Annual Mammography.    Exam results letter mailed to patient.      KAYLEY KENNEDY MD      Right hip x-ray results were reviewed which show severe bone-on-bone arthritis.  Colonoscopy report was reviewed which showed a normal colonoscopy.    ASSESSMENT/PLAN:       ICD-10-CM    1. Hypertension with goal blood pressure less than 140/90 I10 amLODIPine (NORVASC) 5 MG tablet   2. Primary osteoarthritis of right hip M16.11    3. Abdominal pain, epigastric R10.13      Blood pressure is somewhat better, but still not optimal  We will keep her on the same amlodipine for now  She will plan to get a home blood pressure cuff and keep track of her home blood pressure readings over the next few months and then return for recheck    Discussed her severe right hip osteoarthritis  She will likely need joint replacement in the near future  She wants to hold on that for now  She will continue with over-the-counter analgesics  Discussed possible cortisone injection for her    Her abdominal pain is better, so she will just use the omeprazole intermittently  I told her to try to get by with acetaminophen over the ibuprofen if possible    She will try to clarify what kind of polyps her brother had    Plan a recheck on hypertension in about 2-3 months    Albert Grijalva MD  [facility]

## 2018-09-13 NOTE — MR AVS SNAPSHOT
"              After Visit Summary   9/13/2018    Cynthia Cheatham    MRN: [id number]           Patient Information     Date Of Birth          1955        Visit Information        Provider Department      9/13/2018 3:40 PM Albert Grijalva MD [facility]        Today's Diagnoses     Hypertension with goal blood pressure less than 140/90    -  1    Primary osteoarthritis of right hip        Abdominal pain, epigastric           Follow-ups after your visit        Follow-up notes from your care team     Return in about 3 months (around 12/13/2018) for BP Recheck.      Who to contact     If you have questions or need follow up information about today's clinic visit or your schedule please contact [facility] directly at [phone].  Normal or non-critical lab and imaging results will be communicated to you by [employer], letter or phone within 4 business days after the clinic has received the results. If you do not hear from us within 7 days, please contact the clinic through [employer] or phone. If you have a critical or abnormal lab result, we will notify you by phone as soon as possible.  Submit refill requests through [employer] or call your pharmacy and they will forward the refill request to us. Please allow 3 business days for your refill to be completed.          Additional Information About Your Visit        MyChart Information     [employer] lets you send messages to your doctor, view your test results, renew your prescriptions, schedule appointments and more. To sign up, go to [email or web address] . Click on \"Log in\" on the left side of the screen, which will take you to the Welcome page. Then click on \"Sign up Now\" on the right side of the page.     You will be asked to enter the access code listed below, as well as some personal information. Please follow the directions to create your username and password.     Your access code is: [email or web address]  Expires: 11/1/2018 " 10:40 AM     Your access code will  in 90 days. If you need help or a new code, please call your [place] clinic or [phone].        Care EveryWhere ID     This is your Care EveryWhere ID. This could be used by other organizations to access your [place] medical records  [id number]        Your Vitals Were     Pulse Temperature Pulse Oximetry BMI (Body Mass Index)          80 98.6  F (37  C) (Oral) 97% 30.54 kg/m2         Blood Pressure from Last 3 Encounters:   18 142/89   18 153/88   18 (!) 141/92    Weight from Last 3 Encounters:   18 159 lb (72.1 kg)   18 161 lb (73 kg)   18 160 lb 12 oz (72.9 kg)              Today, you had the following     No orders found for display         Today's Medication Changes          These changes are accurate as of 18  4:23 PM.  If you have any questions, ask your nurse or doctor.               These medicines have changed or have updated prescriptions.        Dose/Directions    omeprazole 20 MG CR capsule   Commonly known as:  priLOSEC   This may have changed:  additional instructions   Used for:  Abdominal pain, epigastric        Dose:  20 mg   Take 1 capsule (20 mg) by mouth daily a half hour before a meal   Quantity:  30 capsule   Refills:  1            Where to get your medicines      These medications were sent to [id number] - [place], [place] - [address] AT [facility] [address]  [address], [place] [address]     Phone:  [phone]     amLODIPine 5 MG tablet                Primary Care Provider Fax #    Physician No Ref-Primary [phone]       No address on file        Equal Access to Services     [facility] : Hadii bart bradley Sosantos, waaxda luqadaha, qaybta kaalmada adeegyada, moustapha garcia. So [facility] [phone].    ATENCIÓN: Si habla español, tiene a garay disposición servicios gratuitos de asistencia lingüística. Llame al  [phone].    We comply with applicable federal civil rights laws and [place] laws. We do not discriminate on the basis of race, color, national origin, age, disability, sex, sexual orientation, or gender identity.            Thank you!     Thank you for choosing [facility]  for your care. Our goal is always to provide you with excellent care. Hearing back from our patients is one way we can continue to improve our services. Please take a few minutes to complete the written survey that you may receive in the mail after your visit with us. Thank you!             Your Updated Medication List - Protect others around you: Learn how to safely use, store and throw away your medicines at www.disposemymeds.org.          This list is accurate as of 9/13/18  4:23 PM.  Always use your most recent med list.                   Brand Name Dispense Instructions for use Diagnosis    amLODIPine 5 MG tablet    NORVASC    90 tablet    Take 1 tablet (5 mg) by mouth daily    Hypertension with goal blood pressure less than 140/90       ibuprofen 200 MG tablet    ADVIL/MOTRIN     Take 200 mg by mouth every 4 hours as needed for mild pain    Abdominal pain, epigastric       omeprazole 20 MG CR capsule    priLOSEC    30 capsule    Take 1 capsule (20 mg) by mouth daily a half hour before a meal    Abdominal pain, epigastric       TYLENOL 325 MG tablet   Generic drug:  acetaminophen      Take 325-650 mg by mouth every 6 hours as needed for mild pain    Abdominal pain, epigastric

## 2018-10-04 DIAGNOSIS — I10 HYPERTENSION WITH GOAL BLOOD PRESSURE LESS THAN 140/90: ICD-10-CM

## 2018-10-04 NOTE — TELEPHONE ENCOUNTER
"Requested Prescriptions   Pending Prescriptions Disp Refills     amLODIPine (NORVASC) 5 MG tablet [Pharmacy Med Name: AMLODIPINE BESYLATE 5MG TABLETS] 30 tablet 0    Last Written Prescription Date:  9/13/18  Last Fill Quantity: 90,  # refills: 1   Last office visit: 9/13/2018 with prescribing provider:     Future Office Visit:     Sig: TAKE 1 TABLET(5 MG) BY MOUTH DAILY    Calcium Channel Blockers Protocol  Failed    10/4/2018  5:35 PM       Failed - Blood pressure under 140/90 in past 12 months    BP Readings from Last 3 Encounters:   09/13/18 144/85   08/09/18 153/88   08/03/18 (!) 141/92                Passed - Recent (12 mo) or future (30 days) visit within the authorizing provider's specialty    Patient had office visit in the last 12 months or has a visit in the next 30 days with authorizing provider or within the authorizing provider's specialty.  See \"Patient Info\" tab in inbasket, or \"Choose Columns\" in Meds & Orders section of the refill encounter.           Passed - Patient is age 18 or older       Passed - No active pregnancy on record       Passed - Normal serum creatinine on file in past 12 months    Recent Labs   Lab Test  08/03/18   1055   CR  0.60            Passed - No positive pregnancy test in past 12 months          "

## 2018-10-05 RX ORDER — AMLODIPINE BESYLATE 5 MG/1
TABLET ORAL
Qty: 90 TABLET | Refills: 0 | Status: SHIPPED | OUTPATIENT
Start: 2018-10-05 | End: 2019-09-13

## 2018-10-05 NOTE — TELEPHONE ENCOUNTER
Routing refill request to provider for review/approval because:  Labs out of range:  BP out of range  Please advise.  Thank you.  [place] RN

## 2019-05-16 ENCOUNTER — TELEPHONE (OUTPATIENT)
Dept: FAMILY MEDICINE | Facility: CLINIC | Age: 64
End: 2019-05-16

## 2019-05-16 NOTE — LETTER
May 16, 2019    Cynthia Cheatham  [address] [facility] [address]    Dear Cynthia,    We care about your health and have reviewed your health plan. You are in particular need of attention regarding:  - Your High Blood Pressure, Please call to schedule an appointment with your provider or nurse    Here is a list of other Health Maintenance topics that are due now or due soon:  Health Maintenance Due   Topic Date Due     HIV SCREEN (SYSTEM ASSIGNED)  05/19/1975     HEPATITIS C SCREENING  05/19/1975     ZOSTER IMMUNIZATION (1 of 2) 05/19/2007     PHQ-2  01/01/2019       Please call us at [phone], or use [employer] to address the above recommendations.     Thank you for trusting [facility] with your healthcare needs.     Healthy Regards,    Your Care Team    (Team 1)

## 2019-07-03 DIAGNOSIS — I10 HYPERTENSION WITH GOAL BLOOD PRESSURE LESS THAN 140/90: ICD-10-CM

## 2019-07-03 NOTE — TELEPHONE ENCOUNTER
"Requested Prescriptions   Pending Prescriptions Disp Refills     amLODIPine (NORVASC) 5 MG tablet [Pharmacy Med Name: AMLODIPINE BESYLATE 5MG TABLETS] 90 tablet 0     Sig: TAKE 1 TABLET(5 MG) BY MOUTH DAILY   Last Written Prescription Date:  10/5/18  Last Fill Quantity: 90,  # refills: 0   Last office visit: 9/13/2018 with prescribing provider:     Future Office Visit:        Calcium Channel Blockers Protocol  Failed - 7/3/2019  5:12 PM        Failed - Blood pressure under 140/90 in past 12 months     BP Readings from Last 3 Encounters:   09/13/18 144/85   08/09/18 153/88   08/03/18 (!) 141/92                 Passed - Recent (12 mo) or future (30 days) visit within the authorizing provider's specialty     Patient had office visit in the last 12 months or has a visit in the next 30 days with authorizing provider or within the authorizing provider's specialty.  See \"Patient Info\" tab in inbasket, or \"Choose Columns\" in Meds & Orders section of the refill encounter.              Passed - Medication is active on med list        Passed - Patient is age 18 or older        Passed - No active pregnancy on record        Passed - Normal serum creatinine on file in past 12 months     Recent Labs   Lab Test 08/03/18  1055   CR 0.60             Passed - No positive pregnancy test in past 12 months          "

## 2019-07-05 RX ORDER — AMLODIPINE BESYLATE 5 MG/1
TABLET ORAL
Qty: 30 TABLET | Refills: 0 | Status: SHIPPED | OUTPATIENT
Start: 2019-07-05 | End: 2019-09-13

## 2019-07-05 NOTE — TELEPHONE ENCOUNTER
Routing refill request to provider for review/approval because:  Drug not on the [facility] refill protocol Monica Alvarez RN [facility] Triage.

## 2019-08-05 DIAGNOSIS — I10 HYPERTENSION WITH GOAL BLOOD PRESSURE LESS THAN 140/90: ICD-10-CM

## 2019-08-05 NOTE — TELEPHONE ENCOUNTER
"Requested Prescriptions   Pending Prescriptions Disp Refills     amLODIPine (NORVASC) 5 MG tablet [Pharmacy Med Name: AMLODIPINE BESYLATE 5MG TABLETS] 30 tablet 0     Sig: TAKE 1 TABLET BY MOUTH EVERY DAY   Last Written Prescription Date:  7-5-19  Last Fill Quantity: 30,  # refills: 0   Last office visit: 9/13/2018 with prescribing provider:  9-13-18   Future Office Visit:        Calcium Channel Blockers Protocol  Failed - 8/5/2019  5:12 PM        Failed - Blood pressure under 140/90 in past 12 months     BP Readings from Last 3 Encounters:   09/13/18 144/85   08/09/18 153/88   08/03/18 (!) 141/92                 Failed - Normal serum creatinine on file in past 12 months     Recent Labs   Lab Test 08/03/18  1055   CR 0.60             Passed - Recent (12 mo) or future (30 days) visit within the authorizing provider's specialty     Patient had office visit in the last 12 months or has a visit in the next 30 days with authorizing provider or within the authorizing provider's specialty.  See \"Patient Info\" tab in inbasket, or \"Choose Columns\" in Meds & Orders section of the refill encounter.              Passed - Medication is active on med list        Passed - Patient is age 18 or older        Passed - No active pregnancy on record        Passed - No positive pregnancy test in past 12 months          "

## 2019-08-08 NOTE — TELEPHONE ENCOUNTER
Patient/family was instructed to return call to [facility] RN directly on the RN call back line at [phone].  Yulia Holbrook,Clinic Rn  [place]

## 2019-08-09 RX ORDER — AMLODIPINE BESYLATE 5 MG/1
TABLET ORAL
Qty: 30 TABLET | Refills: 0 | OUTPATIENT
Start: 2019-08-09

## 2019-09-13 ENCOUNTER — OFFICE VISIT (OUTPATIENT)
Dept: FAMILY MEDICINE | Facility: CLINIC | Age: 64
End: 2019-09-13
Payer: COMMERCIAL

## 2019-09-13 VITALS
TEMPERATURE: 99.1 F | SYSTOLIC BLOOD PRESSURE: 167 MMHG | HEART RATE: 84 BPM | OXYGEN SATURATION: 98 % | DIASTOLIC BLOOD PRESSURE: 87 MMHG

## 2019-09-13 DIAGNOSIS — R73.01 IMPAIRED FASTING GLUCOSE: ICD-10-CM

## 2019-09-13 DIAGNOSIS — E78.5 HYPERLIPIDEMIA LDL GOAL <130: ICD-10-CM

## 2019-09-13 DIAGNOSIS — I10 HYPERTENSION WITH GOAL BLOOD PRESSURE LESS THAN 140/90: Primary | ICD-10-CM

## 2019-09-13 PROCEDURE — 99213 OFFICE O/P EST LOW 20 MIN: CPT | Performed by: FAMILY MEDICINE

## 2019-09-13 RX ORDER — AMLODIPINE BESYLATE 5 MG/1
TABLET ORAL
Qty: 30 TABLET | Refills: 0 | Status: SHIPPED | OUTPATIENT
Start: 2019-09-13 | End: 2019-10-11

## 2019-09-13 ASSESSMENT — PAIN SCALES - GENERAL: PAINLEVEL: MILD PAIN (3)

## 2019-09-13 NOTE — PROGRESS NOTES
Subjective     Cynthia Cheatham is a 64 year old female who presents to clinic today for the following health issues:    HPI   Hypertension Follow-up      Do you check your blood pressure regularly outside of the clinic? No     Are you following a low salt diet? Yes- sometimes    Are your blood pressures ever more than 140 on the top number (systolic) OR more   than 90 on the bottom number (diastolic), for example 140/90? No    How many servings of fruits and vegetables do you eat daily?  2-3- most days    On average, how many sweetened beverages do you drink each day (soda, juice, sweet tea, etc)?   0    How many days per week do you miss taking your medication? 0    Kallie Du MA    She has been out of the amlodipine now for a month.  She had been taking faithfully prior to that.  She was checking blood pressure readings periodically on her own and they were generally running in the 130's systolic.  She was feeling fine on the medication.    Patient Active Problem List   Diagnosis     Hyperlipidemia LDL goal <130     Impaired fasting glucose     Hypertension with goal blood pressure less than 140/90     Primary osteoarthritis of right hip     History reviewed. No pertinent surgical history.    Social History     Tobacco Use     Smoking status: Never Smoker     Smokeless tobacco: Never Used   Substance Use Topics     Alcohol use: Yes     Comment: 7-14/ week, wine     Family History   Problem Relation Age of Onset     Prostate Cancer Father      Hyperlipidemia Brother      Prostate Cancer Brother          Current Outpatient Medications   Medication Sig Dispense Refill     acetaminophen (TYLENOL) 325 MG tablet Take 325-650 mg by mouth every 6 hours as needed for mild pain       amLODIPine (NORVASC) 5 MG tablet TAKE 1 TABLET(5 MG) BY MOUTH DAILY 30 tablet 0     Cholecalciferol (VITAMIN D PO)        GLUCOSAMINE-CHONDROITIN PO        ibuprofen (ADVIL/MOTRIN) 200 MG tablet Take 200 mg by mouth every 4 hours as  needed for mild pain       Allergies   Allergen Reactions     Penicillins        Reviewed and updated as needed this visit by Provider         Review of Systems   ROS COMP: Constitutional, HEENT, cardiovascular, pulmonary, gi and gu systems are negative, except as otherwise noted.      Objective    BP (!) 167/87 (BP Location: Right arm, Patient Position: Chair, Cuff Size: Adult Large)   Pulse 84   Temp 99.1  F (37.3  C) (Oral)   SpO2 98%   There is no height or weight on file to calculate BMI.  Physical Exam   GENERAL: healthy, alert and no distress    Diagnostic Test Results:  Labs reviewed in Epic        Assessment & Plan       ICD-10-CM    1. Hypertension with goal blood pressure less than 140/90 I10 amLODIPine (NORVASC) 5 MG tablet   2. Impaired fasting glucose R73.01    3. Hyperlipidemia LDL goal <130 E78.5      Her blood pressure is high today, but she has been off amlodipine for a month  We will get her back on the amlodipine 5 mg daily  She does have a history of elevated glucose and cholesterol, so we will have her return in 4 weeks for recheck on her blood pressure along with some fasting lab work    Return in about 4 weeks (around 10/11/2019) for BP Recheck.    Albert Grijalva MD  [facility]

## 2019-10-11 ENCOUNTER — OFFICE VISIT (OUTPATIENT)
Dept: FAMILY MEDICINE | Facility: CLINIC | Age: 64
End: 2019-10-11
Payer: COMMERCIAL

## 2019-10-11 VITALS
HEART RATE: 104 BPM | HEIGHT: 61 IN | WEIGHT: 162 LBS | SYSTOLIC BLOOD PRESSURE: 127 MMHG | BODY MASS INDEX: 30.58 KG/M2 | DIASTOLIC BLOOD PRESSURE: 76 MMHG | TEMPERATURE: 98.9 F | OXYGEN SATURATION: 98 %

## 2019-10-11 DIAGNOSIS — Z11.4 ENCOUNTER FOR SCREENING FOR HIV: ICD-10-CM

## 2019-10-11 DIAGNOSIS — I10 HYPERTENSION WITH GOAL BLOOD PRESSURE LESS THAN 140/90: Primary | ICD-10-CM

## 2019-10-11 DIAGNOSIS — E78.5 HYPERLIPIDEMIA LDL GOAL <130: ICD-10-CM

## 2019-10-11 DIAGNOSIS — Z11.59 ENCOUNTER FOR HEPATITIS C SCREENING TEST FOR LOW RISK PATIENT: ICD-10-CM

## 2019-10-11 DIAGNOSIS — Z23 NEED FOR PROPHYLACTIC VACCINATION AND INOCULATION AGAINST INFLUENZA: ICD-10-CM

## 2019-10-11 DIAGNOSIS — R73.01 IMPAIRED FASTING GLUCOSE: ICD-10-CM

## 2019-10-11 LAB
ANION GAP SERPL CALCULATED.3IONS-SCNC: 10 MMOL/L (ref 3–14)
BUN SERPL-MCNC: 22 MG/DL (ref 7–30)
CALCIUM SERPL-MCNC: 9.3 MG/DL (ref 8.5–10.1)
CHLORIDE SERPL-SCNC: 105 MMOL/L (ref 94–109)
CHOLEST SERPL-MCNC: 274 MG/DL
CO2 SERPL-SCNC: 23 MMOL/L (ref 20–32)
CREAT SERPL-MCNC: 0.56 MG/DL (ref 0.52–1.04)
GFR SERPL CREATININE-BSD FRML MDRD: >90 ML/MIN/{1.73_M2}
GLUCOSE SERPL-MCNC: 107 MG/DL (ref 70–99)
HBA1C MFR BLD: 5.8 % (ref 0–5.6)
HDLC SERPL-MCNC: 47 MG/DL
LDLC SERPL CALC-MCNC: 201 MG/DL
NONHDLC SERPL-MCNC: 227 MG/DL
POTASSIUM SERPL-SCNC: 4 MMOL/L (ref 3.4–5.3)
SODIUM SERPL-SCNC: 138 MMOL/L (ref 133–144)
TRIGL SERPL-MCNC: 131 MG/DL

## 2019-10-11 PROCEDURE — 87389 HIV-1 AG W/HIV-1&-2 AB AG IA: CPT | Performed by: FAMILY MEDICINE

## 2019-10-11 PROCEDURE — 83036 HEMOGLOBIN GLYCOSYLATED A1C: CPT | Performed by: FAMILY MEDICINE

## 2019-10-11 PROCEDURE — 80061 LIPID PANEL: CPT | Performed by: FAMILY MEDICINE

## 2019-10-11 PROCEDURE — 90471 IMMUNIZATION ADMIN: CPT | Performed by: FAMILY MEDICINE

## 2019-10-11 PROCEDURE — 90682 RIV4 VACC RECOMBINANT DNA IM: CPT | Performed by: FAMILY MEDICINE

## 2019-10-11 PROCEDURE — 36415 COLL VENOUS BLD VENIPUNCTURE: CPT | Performed by: FAMILY MEDICINE

## 2019-10-11 PROCEDURE — 99213 OFFICE O/P EST LOW 20 MIN: CPT | Mod: 25 | Performed by: FAMILY MEDICINE

## 2019-10-11 PROCEDURE — 80048 BASIC METABOLIC PNL TOTAL CA: CPT | Performed by: FAMILY MEDICINE

## 2019-10-11 PROCEDURE — 86803 HEPATITIS C AB TEST: CPT | Performed by: FAMILY MEDICINE

## 2019-10-11 RX ORDER — AMLODIPINE BESYLATE 5 MG/1
TABLET ORAL
Qty: 90 TABLET | Refills: 3 | Status: SHIPPED | OUTPATIENT
Start: 2019-10-11 | End: 2020-10-23

## 2019-10-11 ASSESSMENT — MIFFLIN-ST. JEOR: SCORE: 1224.27

## 2019-10-11 NOTE — LETTER
[facility]   [address] [place]  [address]  [phone]                                   October 14, 2019    Cynthia Cheatham  [address]  [facility] [address]        Dear Cynthia,    Your lab work shows a mixed picture.  Your electrolytes and kidney tests are normal.  Your HIV and hepatitis C screening tests are normal/negative.  Your blood sugar is still mildly elevated, similar to the last check, but not up into a diabetes range.  Unfortunately, your cholesterol values are still elevated and higher than the past, however.  They are high enough to warrant cholesterol-lowering medication.  If you are open to this, please call me at [phone] and leave me a message to that effect and I could prescribe something to lower your cholesterol values (similar to what we are doing with blood pressure medication), which would lessen your risk for a heart attack or stroke going forward.  Otherwise, I would recommend working hard on diet and exercise treatment and rechecking these values next summer with your next physical.    Results for orders placed or performed in visit on 10/11/19   Hepatitis C Screen Reflex to HCV RNA Quant and Genotype   Result Value Ref Range    Hepatitis C Antibody Nonreactive NR^Nonreactive   HIV Screening   Result Value Ref Range    HIV Antigen Antibody Combo Nonreactive NR^Nonreactive       Hemoglobin A1c   Result Value Ref Range    Hemoglobin A1C 5.8 (H) 0 - 5.6 %   Basic metabolic panel   Result Value Ref Range    Sodium 138 133 - 144 mmol/L    Potassium 4.0 3.4 - 5.3 mmol/L    Chloride 105 94 - 109 mmol/L    Carbon Dioxide 23 20 - 32 mmol/L    Anion Gap 10 3 - 14 mmol/L    Glucose 107 (H) 70 - 99 mg/dL    Urea Nitrogen 22 7 - 30 mg/dL    Creatinine 0.56 0.52 - 1.04 mg/dL    GFR Estimate >90 >60 mL/min/[1.73_m2]    GFR Estimate If Black >90 >60 mL/min/[1.73_m2]    Calcium 9.3 8.5 - 10.1 mg/dL   Lipid panel reflex to direct LDL Fasting    Result Value Ref Range    Cholesterol 274 (H) <200 mg/dL    Triglycerides 131 <150 mg/dL    HDL Cholesterol 47 (L) >49 mg/dL    LDL Cholesterol Calculated 201 (H) <100 mg/dL    Non HDL Cholesterol 227 (H) <130 mg/dL       If you have any questions please call the clinic at [phone]    Sincerely,    Albert Grijalva MD  [facility]

## 2019-10-11 NOTE — PROGRESS NOTES
Subjective     Cynthia Cheatham is a 64 year old female who presents to clinic today for the following health issues:    HPI   Hypertension Follow-up      Do you check your blood pressure regularly outside of the clinic? Yes     Are you following a low salt diet? Yes    Are your blood pressures ever more than 140 on the top number (systolic) OR more   than 90 on the bottom number (diastolic), for example 140/90? Yes      How many servings of fruits and vegetables do you eat daily?  4 or more    On average, how many sweetened beverages do you drink each day (soda, juice, sweet tea, etc)?   0    How many days per week do you miss taking your medication? 0      She has been back on her amlodipine 5 mg daily over the last month and has been checking home blood pressure readings.  They are generally in the 120s to 140 range systolic and 70s to 80s diastolic.  She is feeling well on her blood pressure medicine.  She is trying to eat healthy and trying to get extra exercise.  She is following a low-sodium diet.    Patient Active Problem List   Diagnosis     Hyperlipidemia LDL goal <130     Impaired fasting glucose     Hypertension with goal blood pressure less than 140/90     Primary osteoarthritis of right hip     History reviewed. No pertinent surgical history.    Social History     Tobacco Use     Smoking status: Never Smoker     Smokeless tobacco: Never Used   Substance Use Topics     Alcohol use: Yes     Comment: 7-14/ week, wine     Family History   Problem Relation Age of Onset     Prostate Cancer Father      Hyperlipidemia Brother      Prostate Cancer Brother          Current Outpatient Medications   Medication Sig Dispense Refill     acetaminophen (TYLENOL) 325 MG tablet Take 325-650 mg by mouth every 6 hours as needed for mild pain       amLODIPine (NORVASC) 5 MG tablet TAKE 1 TABLET(5 MG) BY MOUTH DAILY 90 tablet 3     Cholecalciferol (VITAMIN D PO)        GLUCOSAMINE-CHONDROITIN PO        ibuprofen  "(ADVIL/MOTRIN) 200 MG tablet Take 200 mg by mouth every 4 hours as needed for mild pain       Allergies   Allergen Reactions     Penicillins          Reviewed and updated as needed this visit by Provider         Review of Systems   ROS COMP: Constitutional, HEENT, cardiovascular, pulmonary, gi and gu systems are negative, except as otherwise noted.      Objective    /76 (BP Location: Left arm, Patient Position: Sitting, Cuff Size: Adult Regular)   Pulse 104   Temp 98.9  F (37.2  C) (Oral)   Ht 1.537 m (5' 0.5\")   Wt 73.5 kg (162 lb)   SpO2 98%   BMI 31.12 kg/m    Body mass index is 31.12 kg/m .  Physical Exam   GENERAL: healthy, alert and no distress      Diagnostic Test Results:  Labs reviewed in Epic        Assessment & Plan       ICD-10-CM    1. Hypertension with goal blood pressure less than 140/90 I10 amLODIPine (NORVASC) 5 MG tablet   2. Hyperlipidemia LDL goal <130 E78.5 Lipid panel reflex to direct LDL Fasting   3. Impaired fasting glucose R73.01 Hemoglobin A1c     Basic metabolic panel   4. Need for prophylactic vaccination and inoculation against influenza Z23 INFLUENZA QUAD, RECOMBINANT, P-FREE (RIV4) (FLUBLOCK) [25441]     Vaccine Administration, Initial [15992]   5. Encounter for screening for HIV Z11.4 HIV Screening   6. Encounter for hepatitis C screening test for low risk patient Z11.59 Hepatitis C Screen Reflex to HCV RNA Quant and Genotype        BMI:   Estimated body mass index is 31.12 kg/m  as calculated from the following:    Height as of this encounter: 1.537 m (5' 0.5\").    Weight as of this encounter: 73.5 kg (162 lb).   Weight management plan: Discussed healthy diet and exercise guidelines    Her blood pressure seems adequately controlled on the amlodipine 5 mg daily  We will continue that same blood pressure medication  We will check fasting lab work today as above  We will give her a flu shot  She will continue to check home blood pressure readings  If they are doing well, " then plan a recheck next summer with an annual physical and repeat lab work    Return in about 10 months (around 8/11/2020) for Lab Work, Physical Exam.    Albert Grijalva MD  [facility]

## 2019-10-14 LAB
HCV AB SERPL QL IA: NONREACTIVE
HIV 1+2 AB+HIV1 P24 AG SERPL QL IA: NONREACTIVE

## 2019-10-14 NOTE — RESULT ENCOUNTER NOTE
Cynthia,  Your lab work shows a mixed picture.  Your electrolytes and kidney tests are normal.  Your HIV and hepatitis C screening tests are normal/negative.  Your blood sugar is still mildly elevated, similar to the last check, but not up into a diabetes range.  Unfortunately, your cholesterol values are still elevated and higher than the past, however.  They are high enough to warrant cholesterol-lowering medication.  If you are open to this, please call me at [phone] and leave me a message to that effect and I could prescribe something to lower your cholesterol values (similar to what we are doing with blood pressure medication), which would lessen your risk for a heart attack or stroke going forward.  Otherwise, I would recommend working hard on diet and exercise treatment and rechecking these values next summer with your next physical.    Albert Grijalva MD

## 2019-10-18 ENCOUNTER — TELEPHONE (OUTPATIENT)
Dept: FAMILY MEDICINE | Facility: CLINIC | Age: 64
End: 2019-10-18

## 2019-10-18 DIAGNOSIS — E78.5 HYPERLIPIDEMIA LDL GOAL <130: Primary | ICD-10-CM

## 2019-10-18 NOTE — TELEPHONE ENCOUNTER
"See lab result letter sent 10/14/19:    Dear Cynthia,     Your lab work shows a mixed picture.  Your electrolytes and kidney tests are normal.  Your HIV and hepatitis C screening tests are normal/negative.  Your blood sugar is still mildly elevated, similar to the last check, but not up into a diabetes range.  Unfortunately, your cholesterol values are still elevated and higher than the past, however.  They are high enough to warrant cholesterol-lowering medication.  If you are open to this, please call me at [phone] and leave me a message to that effect and I could prescribe something to lower your cholesterol values (similar to what we are doing with blood pressure medication), which would lessen your risk for a heart attack or stroke going forward.  Otherwise, I would recommend working hard on diet and exercise treatment and rechecking these values next summer with your next physical.      I called and spoke to patient, she is open to starting a medication.    However, she is mainly worried about the possibility that taking a statin could affect her blood sugars as she is \"borderline\" on her A1C.   She says her dad's family has strong history of diabetes.      Lab Results   Component Value Date    A1C 5.8 10/11/2019    A1C 5.8 08/03/2018     Routed to Dr. Grijalva to follow up with patient on risks/benefits.    Ximena Garcia RN  [facility]      "

## 2019-10-18 NOTE — TELEPHONE ENCOUNTER
Reason for Call:  Other call back    Detailed comments: PT requesting to speak with Dr Grijalva.  PT received a letter about starting a new medication and would like to speak to prior to answering if would like to start.    Phone Number Patient can be reached at: Home number on file [phone] (home)    Best Time: Anytime    Can we leave a detailed message on this number? YES    Call taken on 10/18/2019 at 4:33 PM by Lucretia Anderson

## 2019-10-21 RX ORDER — ROSUVASTATIN CALCIUM 10 MG/1
10 TABLET, COATED ORAL DAILY
Qty: 90 TABLET | Refills: 3 | Status: SHIPPED | OUTPATIENT
Start: 2019-10-21 | End: 2020-08-31

## 2019-10-21 NOTE — TELEPHONE ENCOUNTER
Please advise the patient that statins may raise blood sugars a little bit, but generally it is not very significant, especially in light of the reduced risk of heart attack and stroke that comes with lowering cholesterol with a statin.  I would therefore recommend she start rosuvastatin 10 mg daily.  I sent a prescription to her [facility] pharmacy for this.  Please recommend that she take the medicine once a day and then return to see me in 2 to 3 months for a fasting office visit so that we can recheck some lab work on her.

## 2019-10-21 NOTE — TELEPHONE ENCOUNTER
"I called patient, advised her of Dr. Grijalva's note and advice.   She is willing to start the crestor and will schedule follow up in 2-3 months.    She has one more question since we last talked:  She has also read that statins can cause some memory loss/cognition issues.   She says her brother is on a statin and they have noticed his memory has worsened.   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    I don't see memory/cognition issues listed as a concern in online [employer] drug reference.    Routed to Dr. Grijalva to advise.    Ximena Garcia RN  [facility]      "

## 2019-10-21 NOTE — TELEPHONE ENCOUNTER
I would say the potential for that is less clear.  There is some data to suggest that that could happen, but it is especially unlikely from rosuvastatin (compared to other older statins).  There are some studies suggest that statins may help to prevent dementia and cognitive impairment as well, so I do not think this would be a big factor either way.

## 2020-08-29 DIAGNOSIS — I10 HYPERTENSION WITH GOAL BLOOD PRESSURE LESS THAN 140/90: Primary | ICD-10-CM

## 2020-08-29 DIAGNOSIS — E78.5 HYPERLIPIDEMIA LDL GOAL <130: ICD-10-CM

## 2020-08-31 RX ORDER — AMLODIPINE BESYLATE 10 MG/1
TABLET ORAL
Qty: 90 TABLET | Refills: 0 | Status: SHIPPED | OUTPATIENT
Start: 2020-08-31 | End: 2020-10-23

## 2020-08-31 RX ORDER — ROSUVASTATIN CALCIUM 10 MG/1
TABLET, COATED ORAL
Qty: 90 TABLET | Refills: 0 | Status: SHIPPED | OUTPATIENT
Start: 2020-08-31 | End: 2020-10-23

## 2020-08-31 RX ORDER — AMLODIPINE BESYLATE 10 MG/1
10 TABLET ORAL DAILY
COMMUNITY
Start: 2020-06-07 | End: 2020-10-23

## 2020-08-31 NOTE — TELEPHONE ENCOUNTER
Let's have her continue with the same amlodipine 10 mg dose for now along with rosuvastatin 10 mg daily and have her schedule an annual physical with me for sometime in the upcoming weeks.  Please have her come in fasting for that visit.

## 2020-08-31 NOTE — TELEPHONE ENCOUNTER
I called and spoke to patient and advised of plan and Rx's sent.    Offered to schedule, she says she will need to call back to do that when she knows her schedule.    Ximena Garcia RN  [facility]

## 2020-08-31 NOTE — TELEPHONE ENCOUNTER
Pharmacy requesting refill for Amlodipine 10mg. Patient previously prescribed 5mg.     There is no record in chart indicating that PCP increased dose to 10mg.     RN spoke with patient. She confirms that she is currently taking Amlodipine 10mg, 1 tab daily. She states she is also unsure when the dose was change. She states she transferred her Rxs to a different pharmacy a couple months ago, wonders if they filled the wrong dose.     Per meds reconcile in Epic, appears Amlodipine 10mg Rx was filled at [facility] in March 2020 (when Rx was transferred from [facility]).     She has not been checking her BP at home. Denies any dizziness or lightheadedness.     RN also sees she is overdue to follow up on lipid labs for the rosuvastatin.     Routing to PCP - advised clinic appointment to get BP reading/check labs etc, before refill?     Lilian Toledo RN, BSN, PHN  [facility]: [place]

## 2020-10-23 ENCOUNTER — OFFICE VISIT (OUTPATIENT)
Dept: FAMILY MEDICINE | Facility: CLINIC | Age: 65
End: 2020-10-23
Payer: COMMERCIAL

## 2020-10-23 VITALS
WEIGHT: 154 LBS | SYSTOLIC BLOOD PRESSURE: 137 MMHG | DIASTOLIC BLOOD PRESSURE: 65 MMHG | OXYGEN SATURATION: 99 % | BODY MASS INDEX: 30.23 KG/M2 | HEIGHT: 60 IN | TEMPERATURE: 98.9 F | HEART RATE: 99 BPM

## 2020-10-23 DIAGNOSIS — Z12.31 ENCOUNTER FOR SCREENING MAMMOGRAM FOR BREAST CANCER: ICD-10-CM

## 2020-10-23 DIAGNOSIS — Z23 NEED FOR PROPHYLACTIC VACCINATION AND INOCULATION AGAINST INFLUENZA: ICD-10-CM

## 2020-10-23 DIAGNOSIS — E78.5 HYPERLIPIDEMIA LDL GOAL <130: ICD-10-CM

## 2020-10-23 DIAGNOSIS — Z23 NEED FOR SHINGLES VACCINE: ICD-10-CM

## 2020-10-23 DIAGNOSIS — Z00.00 ROUTINE GENERAL MEDICAL EXAMINATION AT A HEALTH CARE FACILITY: Primary | ICD-10-CM

## 2020-10-23 DIAGNOSIS — R73.01 IMPAIRED FASTING GLUCOSE: ICD-10-CM

## 2020-10-23 DIAGNOSIS — I10 HYPERTENSION WITH GOAL BLOOD PRESSURE LESS THAN 140/90: ICD-10-CM

## 2020-10-23 DIAGNOSIS — M67.471 DIGITAL MUCINOUS CYST OF TOE OF RIGHT FOOT: ICD-10-CM

## 2020-10-23 LAB
ALT SERPL W P-5'-P-CCNC: 30 U/L (ref 0–50)
ANION GAP SERPL CALCULATED.3IONS-SCNC: 9 MMOL/L (ref 3–14)
BUN SERPL-MCNC: 15 MG/DL (ref 7–30)
CALCIUM SERPL-MCNC: 9.1 MG/DL (ref 8.5–10.1)
CHLORIDE SERPL-SCNC: 108 MMOL/L (ref 94–109)
CHOLEST SERPL-MCNC: 154 MG/DL
CO2 SERPL-SCNC: 23 MMOL/L (ref 20–32)
CREAT SERPL-MCNC: 0.55 MG/DL (ref 0.52–1.04)
ERYTHROCYTE [DISTWIDTH] IN BLOOD BY AUTOMATED COUNT: 12.2 % (ref 10–15)
GFR SERPL CREATININE-BSD FRML MDRD: >90 ML/MIN/{1.73_M2}
GLUCOSE SERPL-MCNC: 108 MG/DL (ref 70–99)
HBA1C MFR BLD: 5.5 % (ref 0–5.6)
HCT VFR BLD AUTO: 38.3 % (ref 35–47)
HDLC SERPL-MCNC: 67 MG/DL
HGB BLD-MCNC: 13.2 G/DL (ref 11.7–15.7)
LDLC SERPL CALC-MCNC: 67 MG/DL
MCH RBC QN AUTO: 31.3 PG (ref 26.5–33)
MCHC RBC AUTO-ENTMCNC: 34.5 G/DL (ref 31.5–36.5)
MCV RBC AUTO: 91 FL (ref 78–100)
NONHDLC SERPL-MCNC: 87 MG/DL
PLATELET # BLD AUTO: 436 10E9/L (ref 150–450)
POTASSIUM SERPL-SCNC: 4 MMOL/L (ref 3.4–5.3)
RBC # BLD AUTO: 4.22 10E12/L (ref 3.8–5.2)
SODIUM SERPL-SCNC: 140 MMOL/L (ref 133–144)
TRIGL SERPL-MCNC: 100 MG/DL
WBC # BLD AUTO: 5.1 10E9/L (ref 4–11)

## 2020-10-23 PROCEDURE — 90682 RIV4 VACC RECOMBINANT DNA IM: CPT | Performed by: FAMILY MEDICINE

## 2020-10-23 PROCEDURE — 99213 OFFICE O/P EST LOW 20 MIN: CPT | Mod: 25 | Performed by: FAMILY MEDICINE

## 2020-10-23 PROCEDURE — 90471 IMMUNIZATION ADMIN: CPT | Performed by: FAMILY MEDICINE

## 2020-10-23 PROCEDURE — 85027 COMPLETE CBC AUTOMATED: CPT | Performed by: FAMILY MEDICINE

## 2020-10-23 PROCEDURE — 84460 ALANINE AMINO (ALT) (SGPT): CPT | Performed by: FAMILY MEDICINE

## 2020-10-23 PROCEDURE — 36415 COLL VENOUS BLD VENIPUNCTURE: CPT | Performed by: FAMILY MEDICINE

## 2020-10-23 PROCEDURE — 80048 BASIC METABOLIC PNL TOTAL CA: CPT | Performed by: FAMILY MEDICINE

## 2020-10-23 PROCEDURE — 90472 IMMUNIZATION ADMIN EACH ADD: CPT | Performed by: FAMILY MEDICINE

## 2020-10-23 PROCEDURE — 83036 HEMOGLOBIN GLYCOSYLATED A1C: CPT | Performed by: FAMILY MEDICINE

## 2020-10-23 PROCEDURE — 90750 HZV VACC RECOMBINANT IM: CPT | Performed by: FAMILY MEDICINE

## 2020-10-23 PROCEDURE — 80061 LIPID PANEL: CPT | Performed by: FAMILY MEDICINE

## 2020-10-23 PROCEDURE — 99396 PREV VISIT EST AGE 40-64: CPT | Mod: 25 | Performed by: FAMILY MEDICINE

## 2020-10-23 RX ORDER — ROSUVASTATIN CALCIUM 10 MG/1
10 TABLET, COATED ORAL DAILY
Qty: 90 TABLET | Refills: 3 | Status: SHIPPED | OUTPATIENT
Start: 2020-10-23 | End: 2021-10-28

## 2020-10-23 RX ORDER — AMLODIPINE BESYLATE 10 MG/1
10 TABLET ORAL DAILY
Qty: 90 TABLET | Refills: 3 | Status: SHIPPED | OUTPATIENT
Start: 2020-10-23 | End: 2021-10-28

## 2020-10-23 ASSESSMENT — ENCOUNTER SYMPTOMS
CONSTIPATION: 0
COUGH: 0
HEMATURIA: 0
CHILLS: 0
DIARRHEA: 0
HEMATOCHEZIA: 0
ABDOMINAL PAIN: 0

## 2020-10-23 ASSESSMENT — PATIENT HEALTH QUESTIONNAIRE - PHQ9
SUM OF ALL RESPONSES TO PHQ QUESTIONS 1-9: 4
SUM OF ALL RESPONSES TO PHQ QUESTIONS 1-9: 4
10. IF YOU CHECKED OFF ANY PROBLEMS, HOW DIFFICULT HAVE THESE PROBLEMS MADE IT FOR YOU TO DO YOUR WORK, TAKE CARE OF THINGS AT HOME, OR GET ALONG WITH OTHER PEOPLE: NOT DIFFICULT AT ALL

## 2020-10-23 ASSESSMENT — MIFFLIN-ST. JEOR: SCORE: 1181.91

## 2020-10-23 NOTE — RESULT ENCOUNTER NOTE
Cynthia,  Your cholesterol values look absolutely awesome!  They are much improved from last year and are all nice and normal now.  Your blood sugar is still slightly elevated, similar to the last couple of years, but the rest of your labs are all normal as well including electrolytes, liver and kidney tests, and blood counts.  Please continue your same medications.  I would advise another recheck in 1 year.    Albert Grijalva MD

## 2020-10-23 NOTE — PROGRESS NOTES
SUBJECTIVE:   CC: Cynthia Cheatham is an 65 year old woman who presents for a preventive health visit and follow-up on baseline health conditions.       Patient has been advised of split billing requirements and indicates understanding: Yes  Healthy Habits:     Getting at least 3 servings of Calcium per day:  Yes    Bi-annual eye exam:  Yes    Dental care twice a year:  NO    Sleep apnea or symptoms of sleep apnea:  Daytime drowsiness    Diet:  Low salt and Low fat/cholesterol    Frequency of exercise:  4-5 days/week    Duration of exercise:  30-45 minutes    Taking medications regularly:  Yes    Medication side effects:  Not applicable    PHQ-2 Total Score: 4    Additional concerns today:  Yes    Other concerns:       Spot on left arm that is dark in color and getting larger.  Second toe right foot has a lump on it.    She has been taking amlodipine for hypertension and rosuvastatin for hyperlipidemia.  She seems to be tolerating those medicines well.    Today's PHQ-2 Score:   PHQ-2 ( 1999 Pfizer) 10/23/2020   Q1: Little interest or pleasure in doing things 3   Q2: Feeling down, depressed or hopeless 1   PHQ-2 Score 4   Q1: Little interest or pleasure in doing things Nearly every day   Q2: Feeling down, depressed or hopeless Several days   PHQ-2 Score 4       Abuse: Current or Past (Physical, Sexual or Emotional) - No  Do you feel safe in your environment? Yes        Social History     Tobacco Use     Smoking status: Never Smoker     Smokeless tobacco: Never Used   Substance Use Topics     Alcohol use: Yes     Comment: 7-14/ week, wine         Alcohol Use 10/23/2020   Prescreen: >3 drinks/day or >7 drinks/week? No       Reviewed orders with patient.  Reviewed health maintenance and updated orders accordingly - Yes  Patient Active Problem List   Diagnosis     Hyperlipidemia LDL goal <130     Impaired fasting glucose     Hypertension with goal blood pressure less than 140/90     Primary osteoarthritis of right  hip     History reviewed. No pertinent surgical history.    Social History     Tobacco Use     Smoking status: Never Smoker     Smokeless tobacco: Never Used   Substance Use Topics     Alcohol use: Yes     Comment: 7-14/ week, wine     Family History   Problem Relation Age of Onset     Prostate Cancer Father      Hyperlipidemia Brother      Prostate Cancer Brother          Current Outpatient Medications   Medication Sig Dispense Refill     acetaminophen (TYLENOL) 325 MG tablet Take 325-650 mg by mouth every 6 hours as needed for mild pain       amLODIPine (NORVASC) 10 MG tablet Take 1 tablet (10 mg) by mouth daily 90 tablet 3     Cholecalciferol (VITAMIN D3) 25 MCG (1000 UT) CAPS        GLUCOSAMINE-CHONDROITIN PO        rosuvastatin (CRESTOR) 10 MG tablet Take 1 tablet (10 mg) by mouth daily 90 tablet 3     Allergies   Allergen Reactions     Penicillins        Mammogram Screening: Patient over age 50, mutual decision to screen reflected in health maintenance.    Pertinent mammograms are reviewed under the imaging tab.  History of abnormal Pap smear: NO - age 30-65 PAP every 5 years with negative HPV co-testing recommended  PAP / HPV Latest Ref Rng & Units 8/9/2018   PAP - NIL   HPV 16 DNA NEG:Negative Negative   HPV 18 DNA NEG:Negative Negative   OTHER HR HPV NEG:Negative Negative     Reviewed and updated as needed this visit by clinical staff  Tobacco  Allergies  Meds   Med Hx  Surg Hx  Fam Hx  Soc Hx        Reviewed and updated as needed this visit by Provider                    Review of Systems   Constitutional: Negative for chills.   HENT: Negative for congestion.    Respiratory: Negative for cough.    Cardiovascular: Negative for chest pain.   Gastrointestinal: Negative for abdominal pain, constipation, diarrhea and hematochezia.   Genitourinary: Negative for hematuria.      ROS: 10 point ROS neg other than the symptoms noted above in the HPI.       OBJECTIVE:   /65 (BP Location: Left arm, Patient  "Position: Sitting, Cuff Size: Adult Regular)   Pulse 99   Temp 98.9  F (37.2  C) (Oral)   Ht 1.535 m (5' 0.43\")   Wt 69.9 kg (154 lb)   SpO2 99%   BMI 29.65 kg/m    Physical Exam  GENERAL: alert, no distress and over weight  EYES: Eyes grossly normal to inspection, PERRL and conjunctivae and sclerae normal  HENT: Grossly normal  NECK: no adenopathy, no asymmetry, masses, or scars and thyroid normal to palpation  RESP: lungs clear to auscultation - no rales, rhonchi or wheezes  CV: regular rate and rhythm, normal S1 S2, no S3 or S4, no murmur, click or rub, no peripheral edema and peripheral pulses strong  ABDOMEN: soft, nontender, no hepatosplenomegaly, no masses   MS: She is a small cystic lump on the extensor side of her right second toe about 4 mm in diameter, likely a mucinous or ganglion cyst.  SKIN: no suspicious lesions or rashes, but she does have a few scattered solar lentigo including a spot on her left arm which is light brown in color, macular, and consistent with a solar lentigo.  NEURO: Normal strength and tone, mentation intact and speech normal  PSYCH: mentation appears normal, affect normal/bright    Diagnostic Test Results:  Labs reviewed in Epic    ASSESSMENT/PLAN:       ICD-10-CM    1. Routine general medical examination at a health care facility  Z00.00    2. Hypertension with goal blood pressure less than 140/90  I10 amLODIPine (NORVASC) 10 MG tablet     Basic metabolic panel     CBC with platelets   3. Hyperlipidemia LDL goal <130  E78.5 rosuvastatin (CRESTOR) 10 MG tablet     ALT     Lipid panel reflex to direct LDL Fasting   4. Impaired fasting glucose  R73.01 Hemoglobin A1c   5. Digital mucinous cyst of toe of right foot  M67.471    6. Need for prophylactic vaccination and inoculation against influenza  Z23 INFLUENZA QUAD, RECOMBINANT, P-FREE (RIV4) (FLUBLOCK) [72833]     Vaccine Administration, Initial [09449]   7. Need for shingles vaccine  Z23 ZOSTER VACCINE RECOMBINANT ADJUVANTED " "IM NJX     EA ADD'L VACCINE   8. Encounter for screening mammogram for breast cancer  Z12.31 MA SCREENING DIGITAL BILAT - Future  (s+30)     Blood pressure and other vital signs look good  We will check fasting labs today as above  Continue same baseline meds  Reassurance about the skin & right second toe --observation for both of these  We will get her set up for screening mammogram  We will give her a flu shot today  We also discussed Shingrix and we will give her the first dose today and have her return in 2 to 6 months for the second one  Plan a recheck in 1 year, or sooner prn    Patient has been advised of split billing requirements and indicates understanding: Yes  COUNSELING:  Reviewed preventive health counseling, as reflected in patient instructions       Regular exercise       Healthy diet/nutrition       Immunizations    Vaccinated for: Influenza and Zoster          Estimated body mass index is 29.65 kg/m  as calculated from the following:    Height as of this encounter: 1.535 m (5' 0.43\").    Weight as of this encounter: 69.9 kg (154 lb).    Weight management plan: Discussed healthy diet and exercise guidelines    She reports that she has never smoked. She has never used smokeless tobacco.      Counseling Resources:  ATP IV Guidelines  Pooled Cohorts Equation Calculator  Breast Cancer Risk Calculator  BRCA-Related Cancer Risk Assessment: FHS-7 Tool  FRAX Risk Assessment  ICSI Preventive Guidelines  Dietary Guidelines for Americans, 2010  USDA's MyPlate  ASA Prophylaxis  Lung CA Screening    Albert Grijalva MD  [facility]  Answers for HPI/ROS submitted by the patient on 10/23/2020   Annual Exam:  If you checked off any problems, how difficult have these problems made it for you to do your work, take care of things at home, or get along with other people?: Not difficult at all  PHQ9 TOTAL SCORE: 4    "

## 2020-10-23 NOTE — LETTER
[facility]   [address] [place]  [address]  [phone]                                   October 26, 2020    Cynthia Cheatham  [address]  [facility] [address]        Dear Cynthia,    Your cholesterol values look absolutely awesome!  They are much improved from last year and are all nice and normal now.  Your blood sugar is still slightly elevated, similar to the last couple of years, but the rest of your labs are all normal as well including electrolytes, liver and kidney tests, and blood counts.   Please continue your same medications.  I would advise another recheck in 1 year.     Results for orders placed or performed in visit on 10/23/20   ALT     Status: None   Result Value Ref Range    ALT 30 0 - 50 U/L   Lipid panel reflex to direct LDL Fasting     Status: None   Result Value Ref Range    Cholesterol 154 <200 mg/dL    Triglycerides 100 <150 mg/dL    HDL Cholesterol 67 >49 mg/dL    LDL Cholesterol Calculated 67 <100 mg/dL    Non HDL Cholesterol 87 <130 mg/dL   Basic metabolic panel     Status: Abnormal   Result Value Ref Range    Sodium 140 133 - 144 mmol/L    Potassium 4.0 3.4 - 5.3 mmol/L    Chloride 108 94 - 109 mmol/L    Carbon Dioxide 23 20 - 32 mmol/L    Anion Gap 9 3 - 14 mmol/L    Glucose 108 (H) 70 - 99 mg/dL    Urea Nitrogen 15 7 - 30 mg/dL    Creatinine 0.55 0.52 - 1.04 mg/dL    GFR Estimate >90 >60 mL/min/[1.73_m2]    GFR Estimate If Black >90 >60 mL/min/[1.73_m2]    Calcium 9.1 8.5 - 10.1 mg/dL   Hemoglobin A1c     Status: None   Result Value Ref Range    Hemoglobin A1C 5.5 0 - 5.6 %   CBC with platelets     Status: None   Result Value Ref Range    WBC 5.1 4.0 - 11.0 10e9/L    RBC Count 4.22 3.8 - 5.2 10e12/L    Hemoglobin 13.2 11.7 - 15.7 g/dL    Hematocrit 38.3 35.0 - 47.0 %    MCV 91 78 - 100 fl    MCH 31.3 26.5 - 33.0 pg    MCHC 34.5 31.5 - 36.5 g/dL    RDW 12.2 10.0 - 15.0 %    Platelet Count 436 150 - 450 10e9/L       If you have  any questions please call the clinic at [phone]    Sincerely,    Albert Grijalva MD  bmd

## 2020-10-24 ASSESSMENT — PATIENT HEALTH QUESTIONNAIRE - PHQ9: SUM OF ALL RESPONSES TO PHQ QUESTIONS 1-9: 4

## 2020-11-02 ENCOUNTER — ANCILLARY PROCEDURE (OUTPATIENT)
Dept: MAMMOGRAPHY | Facility: CLINIC | Age: 65
End: 2020-11-02
Attending: FAMILY MEDICINE
Payer: COMMERCIAL

## 2020-11-02 DIAGNOSIS — Z12.31 ENCOUNTER FOR SCREENING MAMMOGRAM FOR BREAST CANCER: ICD-10-CM

## 2020-11-02 PROCEDURE — 77067 SCR MAMMO BI INCL CAD: CPT | Performed by: RADIOLOGY

## 2020-11-02 PROCEDURE — 77063 BREAST TOMOSYNTHESIS BI: CPT | Performed by: RADIOLOGY

## 2021-03-15 ENCOUNTER — ALLIED HEALTH/NURSE VISIT (OUTPATIENT)
Dept: NURSING | Facility: CLINIC | Age: 66
End: 2021-03-15
Payer: COMMERCIAL

## 2021-03-15 DIAGNOSIS — Z23 NEED FOR SHINGLES VACCINE: Primary | ICD-10-CM

## 2021-03-15 PROCEDURE — 90471 IMMUNIZATION ADMIN: CPT

## 2021-03-15 PROCEDURE — 90750 HZV VACC RECOMBINANT IM: CPT

## 2021-03-15 PROCEDURE — 99207 PR NO CHARGE NURSE ONLY: CPT

## 2021-04-08 ENCOUNTER — IMMUNIZATION (OUTPATIENT)
Dept: NURSING | Facility: CLINIC | Age: 66
End: 2021-04-08
Payer: COMMERCIAL

## 2021-04-08 PROCEDURE — 0001A PR COVID VAC PFIZER DIL RECON 30 MCG/0.3 ML IM: CPT

## 2021-04-08 PROCEDURE — 91300 PR COVID VAC PFIZER DIL RECON 30 MCG/0.3 ML IM: CPT

## 2021-04-29 ENCOUNTER — IMMUNIZATION (OUTPATIENT)
Dept: NURSING | Facility: CLINIC | Age: 66
End: 2021-04-29
Attending: FAMILY MEDICINE
Payer: COMMERCIAL

## 2021-04-29 PROCEDURE — 91300 PR COVID VAC PFIZER DIL RECON 30 MCG/0.3 ML IM: CPT

## 2021-04-29 PROCEDURE — 0002A PR COVID VAC PFIZER DIL RECON 30 MCG/0.3 ML IM: CPT

## 2021-10-11 ENCOUNTER — MYC MEDICAL ADVICE (OUTPATIENT)
Dept: FAMILY MEDICINE | Facility: CLINIC | Age: 66
End: 2021-10-11

## 2021-10-21 ASSESSMENT — ENCOUNTER SYMPTOMS
WEAKNESS: 0
NERVOUS/ANXIOUS: 0
DYSURIA: 0
DIARRHEA: 0
ABDOMINAL PAIN: 0
HEADACHES: 0
PARESTHESIAS: 0
BREAST MASS: 0
NAUSEA: 0
HEMATURIA: 0
SHORTNESS OF BREATH: 0
DIZZINESS: 0
EYE PAIN: 0
PALPITATIONS: 0
FREQUENCY: 0
CHILLS: 0
ARTHRALGIAS: 1
FEVER: 0
SORE THROAT: 0
COUGH: 0
CONSTIPATION: 0
HEARTBURN: 0
HEMATOCHEZIA: 0
JOINT SWELLING: 0
MYALGIAS: 0

## 2021-10-23 ENCOUNTER — HEALTH MAINTENANCE LETTER (OUTPATIENT)
Age: 66
End: 2021-10-23

## 2021-10-28 ENCOUNTER — OFFICE VISIT (OUTPATIENT)
Dept: FAMILY MEDICINE | Facility: CLINIC | Age: 66
End: 2021-10-28
Payer: COMMERCIAL

## 2021-10-28 VITALS
SYSTOLIC BLOOD PRESSURE: 131 MMHG | HEIGHT: 60 IN | BODY MASS INDEX: 31.22 KG/M2 | OXYGEN SATURATION: 99 % | HEART RATE: 102 BPM | WEIGHT: 159 LBS | TEMPERATURE: 98.8 F | DIASTOLIC BLOOD PRESSURE: 75 MMHG

## 2021-10-28 DIAGNOSIS — M54.41 CHRONIC BILATERAL LOW BACK PAIN WITH BILATERAL SCIATICA: ICD-10-CM

## 2021-10-28 DIAGNOSIS — I10 HYPERTENSION WITH GOAL BLOOD PRESSURE LESS THAN 140/90: ICD-10-CM

## 2021-10-28 DIAGNOSIS — R73.01 IMPAIRED FASTING GLUCOSE: ICD-10-CM

## 2021-10-28 DIAGNOSIS — E78.5 HYPERLIPIDEMIA LDL GOAL <130: ICD-10-CM

## 2021-10-28 DIAGNOSIS — Z00.00 ROUTINE GENERAL MEDICAL EXAMINATION AT A HEALTH CARE FACILITY: Primary | ICD-10-CM

## 2021-10-28 DIAGNOSIS — M54.42 CHRONIC BILATERAL LOW BACK PAIN WITH BILATERAL SCIATICA: ICD-10-CM

## 2021-10-28 DIAGNOSIS — M16.11 PRIMARY OSTEOARTHRITIS OF RIGHT HIP: ICD-10-CM

## 2021-10-28 DIAGNOSIS — Z23 NEED FOR PROPHYLACTIC VACCINATION AND INOCULATION AGAINST INFLUENZA: ICD-10-CM

## 2021-10-28 DIAGNOSIS — G89.29 CHRONIC BILATERAL LOW BACK PAIN WITH BILATERAL SCIATICA: ICD-10-CM

## 2021-10-28 LAB
ALT SERPL W P-5'-P-CCNC: 39 U/L (ref 0–50)
ANION GAP SERPL CALCULATED.3IONS-SCNC: 5 MMOL/L (ref 3–14)
BUN SERPL-MCNC: 9 MG/DL (ref 7–30)
CALCIUM SERPL-MCNC: 8.7 MG/DL (ref 8.5–10.1)
CHLORIDE BLD-SCNC: 109 MMOL/L (ref 94–109)
CHOLEST SERPL-MCNC: 168 MG/DL
CO2 SERPL-SCNC: 24 MMOL/L (ref 20–32)
CREAT SERPL-MCNC: 0.56 MG/DL (ref 0.52–1.04)
ERYTHROCYTE [DISTWIDTH] IN BLOOD BY AUTOMATED COUNT: 12.4 % (ref 10–15)
FASTING STATUS PATIENT QL REPORTED: NO
GFR SERPL CREATININE-BSD FRML MDRD: >90 ML/MIN/1.73M2
GLUCOSE BLD-MCNC: 107 MG/DL (ref 70–99)
HBA1C MFR BLD: 5.7 % (ref 0–5.6)
HCT VFR BLD AUTO: 38.8 % (ref 35–47)
HDLC SERPL-MCNC: 59 MG/DL
HGB BLD-MCNC: 13.2 G/DL (ref 11.7–15.7)
LDLC SERPL CALC-MCNC: 88 MG/DL
MCH RBC QN AUTO: 31.4 PG (ref 26.5–33)
MCHC RBC AUTO-ENTMCNC: 34 G/DL (ref 31.5–36.5)
MCV RBC AUTO: 92 FL (ref 78–100)
NONHDLC SERPL-MCNC: 109 MG/DL
PLATELET # BLD AUTO: 448 10E3/UL (ref 150–450)
POTASSIUM BLD-SCNC: 4 MMOL/L (ref 3.4–5.3)
RBC # BLD AUTO: 4.21 10E6/UL (ref 3.8–5.2)
SODIUM SERPL-SCNC: 138 MMOL/L (ref 133–144)
TRIGL SERPL-MCNC: 106 MG/DL
WBC # BLD AUTO: 5 10E3/UL (ref 4–11)

## 2021-10-28 PROCEDURE — 90682 RIV4 VACC RECOMBINANT DNA IM: CPT | Performed by: FAMILY MEDICINE

## 2021-10-28 PROCEDURE — 90471 IMMUNIZATION ADMIN: CPT | Performed by: FAMILY MEDICINE

## 2021-10-28 PROCEDURE — 83036 HEMOGLOBIN GLYCOSYLATED A1C: CPT | Performed by: FAMILY MEDICINE

## 2021-10-28 PROCEDURE — 99213 OFFICE O/P EST LOW 20 MIN: CPT | Mod: 25 | Performed by: FAMILY MEDICINE

## 2021-10-28 PROCEDURE — 80048 BASIC METABOLIC PNL TOTAL CA: CPT | Performed by: FAMILY MEDICINE

## 2021-10-28 PROCEDURE — 84460 ALANINE AMINO (ALT) (SGPT): CPT | Performed by: FAMILY MEDICINE

## 2021-10-28 PROCEDURE — 99396 PREV VISIT EST AGE 40-64: CPT | Mod: 25 | Performed by: FAMILY MEDICINE

## 2021-10-28 PROCEDURE — 80061 LIPID PANEL: CPT | Performed by: FAMILY MEDICINE

## 2021-10-28 PROCEDURE — 85027 COMPLETE CBC AUTOMATED: CPT | Performed by: FAMILY MEDICINE

## 2021-10-28 PROCEDURE — 36415 COLL VENOUS BLD VENIPUNCTURE: CPT | Performed by: FAMILY MEDICINE

## 2021-10-28 RX ORDER — ROSUVASTATIN CALCIUM 10 MG/1
10 TABLET, COATED ORAL DAILY
Qty: 90 TABLET | Refills: 3 | Status: SHIPPED | OUTPATIENT
Start: 2021-10-28 | End: 2022-11-30

## 2021-10-28 RX ORDER — AMLODIPINE BESYLATE 10 MG/1
10 TABLET ORAL DAILY
Qty: 90 TABLET | Refills: 3 | Status: SHIPPED | OUTPATIENT
Start: 2021-10-28 | End: 2022-11-30

## 2021-10-28 ASSESSMENT — ENCOUNTER SYMPTOMS
FREQUENCY: 0
SHORTNESS OF BREATH: 0
PALPITATIONS: 0
BREAST MASS: 0
COUGH: 0
WEAKNESS: 0
SORE THROAT: 0
HEMATURIA: 0
DIARRHEA: 0
MYALGIAS: 0
HEADACHES: 0
CHILLS: 0
JOINT SWELLING: 0
PARESTHESIAS: 0
NERVOUS/ANXIOUS: 0
DYSURIA: 0
HEARTBURN: 0
ARTHRALGIAS: 1
ABDOMINAL PAIN: 0
NAUSEA: 0
DIZZINESS: 0
EYE PAIN: 0
HEMATOCHEZIA: 0
CONSTIPATION: 0
FEVER: 0

## 2021-10-28 ASSESSMENT — MIFFLIN-ST. JEOR: SCORE: 1198.97

## 2021-10-28 NOTE — PROGRESS NOTES
SUBJECTIVE:   CC: Cynthia Cheatham is an 66 year old woman who presents for a preventive health visit and follow-up on baseline health conditions.       Patient has been advised of split billing requirements and indicates understanding: Yes  Healthy Habits:     Getting at least 3 servings of Calcium per day:  Yes    Bi-annual eye exam:  NO    Dental care twice a year:  NO    Sleep apnea or symptoms of sleep apnea:  Daytime drowsiness    Diet:  Low salt and Low fat/cholesterol    Frequency of exercise:  6-7 days/week    Duration of exercise:  30-45 minutes    Taking medications regularly:  Yes    Medication side effects:  None    PHQ-2 Total Score: 0    Additional concerns today:  Yes      Blood clot in left foot in June. Family history of blood clots. Wanting to know what she should do?  How to deal with sciatica.  Donating blood. O+, hasn't donated blood for 2 years. After donating she had no energy at all. It took 2 weeks to get her energy back. Wondering if her iron is low.    She thinks she had a small blood clot in the top of her left foot back in June.  It cleared on its own.  She has a family history of blood clots in her mother and brother.  She wonders what she can do for prevention.  She has had some low back discomfort with pain down the legs off and on for many months.  She remains on rosuvastatin for hyperlipidemia and amlodipine for hypertension.  She is up-to-date on cancer screening tests.  She has not had a flu shot yet this year.        Today's PHQ-2 Score:   PHQ-2 ( 1999 Pfizer) 10/21/2021   Q1: Little interest or pleasure in doing things 0   Q2: Feeling down, depressed or hopeless 0   PHQ-2 Score 0   Q1: Little interest or pleasure in doing things Not at all   Q2: Feeling down, depressed or hopeless Not at all   PHQ-2 Score 0       Abuse: Current or Past (Physical, Sexual or Emotional) - No  Do you feel safe in your environment? Yes        Social History     Tobacco Use     Smoking status:  Never Smoker     Smokeless tobacco: Never Used   Substance Use Topics     Alcohol use: Yes     Comment: 7-14/ week, wine         Alcohol Use 10/21/2021   Prescreen: >3 drinks/day or >7 drinks/week? No       Reviewed orders with patient.  Reviewed health maintenance and updated orders accordingly - Yes  Patient Active Problem List   Diagnosis     Hyperlipidemia LDL goal <130     Impaired fasting glucose     Hypertension with goal blood pressure less than 140/90     Primary osteoarthritis of right hip     History reviewed. No pertinent surgical history.    Social History     Tobacco Use     Smoking status: Never Smoker     Smokeless tobacco: Never Used   Substance Use Topics     Alcohol use: Yes     Comment: 7-14/ week, wine     Family History   Problem Relation Age of Onset     Prostate Cancer Father      Hyperlipidemia Brother      Prostate Cancer Brother          Current Outpatient Medications   Medication Sig Dispense Refill     acetaminophen (TYLENOL) 325 MG tablet Take 325-650 mg by mouth every 6 hours as needed for mild pain       amLODIPine (NORVASC) 10 MG tablet Take 1 tablet (10 mg) by mouth daily 90 tablet 3     Cholecalciferol (VITAMIN D3) 25 MCG (1000 UT) CAPS        rosuvastatin (CRESTOR) 10 MG tablet Take 1 tablet (10 mg) by mouth daily 90 tablet 3     GLUCOSAMINE-CHONDROITIN PO        Allergies   Allergen Reactions     Pcn [Penicillins]        Breast Cancer Screening:    Breast CA Risk Assessment (FHS-7) 10/21/2021   Do you have a family history of breast, colon, or ovarian cancer? No / Unknown         Mammogram Screening: Recommended mammography every 1-2 years with patient discussion and risk factor consideration  Pertinent mammograms are reviewed under the imaging tab.    History of abnormal Pap smear: NO - age 30-65 PAP every 5 years with negative HPV co-testing recommended  PAP / HPV Latest Ref Rng & Units 8/9/2018   PAP (Historical) - NIL   HPV16 NEG:Negative Negative   HPV18 NEG:Negative  "Negative   HRHPV NEG:Negative Negative     Reviewed and updated as needed this visit by clinical staff  Tobacco  Allergies  Meds   Med Hx  Surg Hx  Fam Hx  Soc Hx        Reviewed and updated as needed this visit by Provider                    Review of Systems   Constitutional: Negative for chills and fever.   HENT: Negative for congestion, ear pain, hearing loss and sore throat.    Eyes: Positive for visual disturbance. Negative for pain.   Respiratory: Negative for cough and shortness of breath.    Cardiovascular: Negative for chest pain, palpitations and peripheral edema.   Gastrointestinal: Negative for abdominal pain, constipation, diarrhea, heartburn, hematochezia and nausea.   Breasts:  Negative for tenderness, breast mass and discharge.   Genitourinary: Negative for dysuria, frequency, genital sores, hematuria, pelvic pain, urgency, vaginal bleeding and vaginal discharge.   Musculoskeletal: Positive for arthralgias. Negative for joint swelling and myalgias.   Skin: Negative for rash.   Neurological: Negative for dizziness, weakness, headaches and paresthesias.   Psychiatric/Behavioral: Negative for mood changes. The patient is not nervous/anxious.           OBJECTIVE:   /75 (BP Location: Left arm, Patient Position: Sitting, Cuff Size: Adult Regular)   Pulse 102   Temp 98.8  F (37.1  C) (Oral)   Ht 1.534 m (5' 0.39\")   Wt 72.1 kg (159 lb)   SpO2 99%   BMI 30.65 kg/m    Physical Exam  GENERAL: alert, no distress and over weight  EYES: Eyes grossly normal to inspection, PERRL and conjunctivae and sclerae normal  HENT: Grossly normal  NECK: no adenopathy, no asymmetry, masses, or scars and thyroid normal to palpation  RESP: lungs clear to auscultation - no rales, rhonchi or wheezes  CV: regular rate and rhythm, normal S1 S2, no S3 or S4, no murmur, click or rub, no peripheral edema and peripheral pulses strong  ABDOMEN: soft, nontender, no hepatosplenomegaly, no masses   MS: no gross " musculoskeletal defects noted, no edema  SKIN: no suspicious lesions or rashes  NEURO: Normal strength and tone, mentation intact and speech normal  PSYCH: mentation appears normal, affect normal/bright    Diagnostic Test Results:  Labs reviewed in Epic    ASSESSMENT/PLAN:       ICD-10-CM    1. Routine general medical examination at a health care facility  Z00.00    2. Hypertension with goal blood pressure less than 140/90  I10 amLODIPine (NORVASC) 10 MG tablet     CBC with platelets   3. Hyperlipidemia LDL goal <130  E78.5 rosuvastatin (CRESTOR) 10 MG tablet     Lipid panel reflex to direct LDL Fasting     ALT   4. Impaired fasting glucose  R73.01 Basic metabolic panel     Hemoglobin A1c   5. Primary osteoarthritis of right hip  M16.11    6. Chronic bilateral low back pain with bilateral sciatica  M54.42     M54.41     G89.29    7. Need for prophylactic vaccination and inoculation against influenza  Z23 INFLUENZA QUAD, RECOMBINANT, P-FREE (RIV4) (FLUBLOK)     Blood pressure and other vital signs look acceptable  We discussed the above items  We will check fasting lab work today as above  Continue same baseline meds  Reviewed some conservative care for the right hip and low back discomfort  She is thinking she will probably try to get a right hip replacement done next year  Discussed various options for DVT prevention including low-dose aspirin, compression stockings and pumping of the feet and ankles on long trips, avoiding prolonged immobilization, etc.  Discussed possibly taking some extra iron after a blood donation  We will give her a flu shot today  Plan a recheck in 1 year, or sooner prn    Patient has been advised of split billing requirements and indicates understanding: Yes  COUNSELING:  Reviewed preventive health counseling, as reflected in patient instructions       Regular exercise       Healthy diet/nutrition       Immunizations    Vaccinated for: Influenza          Estimated body mass index is 30.65  "kg/m  as calculated from the following:    Height as of this encounter: 1.534 m (5' 0.39\").    Weight as of this encounter: 72.1 kg (159 lb).    Weight management plan: Discussed healthy diet and exercise guidelines    She reports that she has never smoked. She has never used smokeless tobacco.      Counseling Resources:  ATP IV Guidelines  Pooled Cohorts Equation Calculator  Breast Cancer Risk Calculator  BRCA-Related Cancer Risk Assessment: FHS-7 Tool  FRAX Risk Assessment  ICSI Preventive Guidelines  Dietary Guidelines for Americans, 2010  USDA's MyPlate  ASA Prophylaxis  Lung CA Screening    Albert Grijalva MD  [facility]  "

## 2021-10-29 NOTE — RESULT ENCOUNTER NOTE
"Cynthia,  These laboratory tests continue to look good and/or stable from the past.  Your cholesterol values remain nice and normal.  Liver and kidney tests are normal.  Blood sugar is still slightly elevated, similar to the last few years, but not up into a diabetes range.  Your blood counts are all normal, so no sign of anemia or \"low iron\".Please continue your same medications.  I would advise another recheck in 1 year.    Albert Grijalva MD  "

## 2022-10-09 ENCOUNTER — HEALTH MAINTENANCE LETTER (OUTPATIENT)
Age: 67
End: 2022-10-09

## 2022-11-29 DIAGNOSIS — I10 HYPERTENSION WITH GOAL BLOOD PRESSURE LESS THAN 140/90: ICD-10-CM

## 2022-11-29 DIAGNOSIS — E78.5 HYPERLIPIDEMIA LDL GOAL <130: ICD-10-CM

## 2022-11-30 RX ORDER — ROSUVASTATIN CALCIUM 10 MG/1
TABLET, COATED ORAL
Qty: 90 TABLET | Refills: 3 | Status: SHIPPED | OUTPATIENT
Start: 2022-11-30 | End: 2023-01-20

## 2022-11-30 RX ORDER — AMLODIPINE BESYLATE 10 MG/1
TABLET ORAL
Qty: 90 TABLET | Refills: 3 | Status: SHIPPED | OUTPATIENT
Start: 2022-11-30 | End: 2023-01-20

## 2022-11-30 NOTE — TELEPHONE ENCOUNTER
Patient has an appointment in mid December for an annual physical.  Her medications were refilled.    Albert Grijalva MD

## 2023-01-18 ASSESSMENT — ENCOUNTER SYMPTOMS
EYE PAIN: 0
BREAST MASS: 0
CONSTIPATION: 0
WEAKNESS: 0
PARESTHESIAS: 0
FEVER: 0
NAUSEA: 0
MYALGIAS: 0
HEARTBURN: 0
DIARRHEA: 0
HEMATURIA: 0
SHORTNESS OF BREATH: 0
CHILLS: 0
JOINT SWELLING: 1
HEADACHES: 1
NERVOUS/ANXIOUS: 0
SORE THROAT: 0
ARTHRALGIAS: 1
ABDOMINAL PAIN: 0
HEMATOCHEZIA: 0
DIZZINESS: 0
COUGH: 0
PALPITATIONS: 0
FREQUENCY: 0
DYSURIA: 0

## 2023-01-18 ASSESSMENT — ACTIVITIES OF DAILY LIVING (ADL): CURRENT_FUNCTION: NO ASSISTANCE NEEDED

## 2023-01-20 ENCOUNTER — ANCILLARY PROCEDURE (OUTPATIENT)
Dept: MAMMOGRAPHY | Facility: CLINIC | Age: 68
End: 2023-01-20
Payer: COMMERCIAL

## 2023-01-20 ENCOUNTER — OFFICE VISIT (OUTPATIENT)
Dept: FAMILY MEDICINE | Facility: CLINIC | Age: 68
End: 2023-01-20
Payer: COMMERCIAL

## 2023-01-20 VITALS
HEIGHT: 60 IN | TEMPERATURE: 99.1 F | HEART RATE: 103 BPM | BODY MASS INDEX: 32 KG/M2 | WEIGHT: 163 LBS | SYSTOLIC BLOOD PRESSURE: 125 MMHG | OXYGEN SATURATION: 98 % | DIASTOLIC BLOOD PRESSURE: 78 MMHG

## 2023-01-20 DIAGNOSIS — Z12.31 VISIT FOR SCREENING MAMMOGRAM: ICD-10-CM

## 2023-01-20 DIAGNOSIS — E66.811 OBESITY (BMI 30.0-34.9): ICD-10-CM

## 2023-01-20 DIAGNOSIS — R73.01 IMPAIRED FASTING GLUCOSE: ICD-10-CM

## 2023-01-20 DIAGNOSIS — E78.5 HYPERLIPIDEMIA LDL GOAL <130: ICD-10-CM

## 2023-01-20 DIAGNOSIS — Z00.00 ENCOUNTER FOR MEDICARE ANNUAL WELLNESS EXAM: Primary | ICD-10-CM

## 2023-01-20 DIAGNOSIS — M16.11 PRIMARY OSTEOARTHRITIS OF RIGHT HIP: ICD-10-CM

## 2023-01-20 DIAGNOSIS — Z23 NEED FOR PNEUMOCOCCAL VACCINATION: ICD-10-CM

## 2023-01-20 DIAGNOSIS — M25.562 LEFT KNEE PAIN, UNSPECIFIED CHRONICITY: ICD-10-CM

## 2023-01-20 DIAGNOSIS — Z78.0 ASYMPTOMATIC POSTMENOPAUSAL STATUS: ICD-10-CM

## 2023-01-20 DIAGNOSIS — I10 HYPERTENSION WITH GOAL BLOOD PRESSURE LESS THAN 140/90: ICD-10-CM

## 2023-01-20 LAB
ANION GAP SERPL CALCULATED.3IONS-SCNC: 9 MMOL/L (ref 3–14)
BUN SERPL-MCNC: 13 MG/DL (ref 7–30)
CALCIUM SERPL-MCNC: 9.8 MG/DL (ref 8.5–10.1)
CHLORIDE BLD-SCNC: 107 MMOL/L (ref 94–109)
CHOLEST SERPL-MCNC: 191 MG/DL
CO2 SERPL-SCNC: 23 MMOL/L (ref 20–32)
CREAT SERPL-MCNC: 0.55 MG/DL (ref 0.52–1.04)
ERYTHROCYTE [DISTWIDTH] IN BLOOD BY AUTOMATED COUNT: 12.6 % (ref 10–15)
FASTING STATUS PATIENT QL REPORTED: YES
GFR SERPL CREATININE-BSD FRML MDRD: >90 ML/MIN/1.73M2
GLUCOSE BLD-MCNC: 130 MG/DL (ref 70–99)
HBA1C MFR BLD: 5.9 % (ref 0–5.6)
HCT VFR BLD AUTO: 40.1 % (ref 35–47)
HDLC SERPL-MCNC: 57 MG/DL
HGB BLD-MCNC: 13.6 G/DL (ref 11.7–15.7)
LDLC SERPL CALC-MCNC: 104 MG/DL
MCH RBC QN AUTO: 31.2 PG (ref 26.5–33)
MCHC RBC AUTO-ENTMCNC: 33.9 G/DL (ref 31.5–36.5)
MCV RBC AUTO: 92 FL (ref 78–100)
NONHDLC SERPL-MCNC: 134 MG/DL
PLATELET # BLD AUTO: 453 10E3/UL (ref 150–450)
POTASSIUM BLD-SCNC: 4.3 MMOL/L (ref 3.4–5.3)
RBC # BLD AUTO: 4.36 10E6/UL (ref 3.8–5.2)
SODIUM SERPL-SCNC: 139 MMOL/L (ref 133–144)
TRIGL SERPL-MCNC: 151 MG/DL
WBC # BLD AUTO: 6.4 10E3/UL (ref 4–11)

## 2023-01-20 PROCEDURE — G0009 ADMIN PNEUMOCOCCAL VACCINE: HCPCS | Performed by: FAMILY MEDICINE

## 2023-01-20 PROCEDURE — 83036 HEMOGLOBIN GLYCOSYLATED A1C: CPT | Performed by: FAMILY MEDICINE

## 2023-01-20 PROCEDURE — 80048 BASIC METABOLIC PNL TOTAL CA: CPT | Performed by: FAMILY MEDICINE

## 2023-01-20 PROCEDURE — 77063 BREAST TOMOSYNTHESIS BI: CPT | Mod: TC | Performed by: RADIOLOGY

## 2023-01-20 PROCEDURE — 90677 PCV20 VACCINE IM: CPT | Performed by: FAMILY MEDICINE

## 2023-01-20 PROCEDURE — 80061 LIPID PANEL: CPT | Performed by: FAMILY MEDICINE

## 2023-01-20 PROCEDURE — 99214 OFFICE O/P EST MOD 30 MIN: CPT | Mod: 25 | Performed by: FAMILY MEDICINE

## 2023-01-20 PROCEDURE — 85027 COMPLETE CBC AUTOMATED: CPT | Performed by: FAMILY MEDICINE

## 2023-01-20 PROCEDURE — 36415 COLL VENOUS BLD VENIPUNCTURE: CPT | Performed by: FAMILY MEDICINE

## 2023-01-20 PROCEDURE — G0402 INITIAL PREVENTIVE EXAM: HCPCS | Performed by: FAMILY MEDICINE

## 2023-01-20 PROCEDURE — 77067 SCR MAMMO BI INCL CAD: CPT | Mod: TC | Performed by: RADIOLOGY

## 2023-01-20 RX ORDER — AMLODIPINE BESYLATE 10 MG/1
10 TABLET ORAL DAILY
Qty: 90 TABLET | Refills: 3 | Status: SHIPPED | OUTPATIENT
Start: 2023-01-20 | End: 2023-08-28

## 2023-01-20 RX ORDER — ROSUVASTATIN CALCIUM 10 MG/1
10 TABLET, COATED ORAL DAILY
Qty: 90 TABLET | Refills: 3 | Status: SHIPPED | OUTPATIENT
Start: 2023-01-20 | End: 2024-02-21

## 2023-01-20 ASSESSMENT — ENCOUNTER SYMPTOMS
DYSURIA: 0
CHILLS: 0
SORE THROAT: 0
FREQUENCY: 0
HEARTBURN: 0
MYALGIAS: 0
ARTHRALGIAS: 1
ABDOMINAL PAIN: 0
WEAKNESS: 0
EYE PAIN: 0
HEADACHES: 1
NAUSEA: 0
HEMATOCHEZIA: 0
FEVER: 0
PALPITATIONS: 0
HEMATURIA: 0
CONSTIPATION: 0
PARESTHESIAS: 0
DIARRHEA: 0
DIZZINESS: 0
BREAST MASS: 0
JOINT SWELLING: 1
NERVOUS/ANXIOUS: 0
COUGH: 0
SHORTNESS OF BREATH: 0

## 2023-01-20 ASSESSMENT — ACTIVITIES OF DAILY LIVING (ADL): CURRENT_FUNCTION: NO ASSISTANCE NEEDED

## 2023-01-20 ASSESSMENT — PAIN SCALES - GENERAL: PAINLEVEL: NO PAIN (0)

## 2023-01-20 NOTE — RESULT ENCOUNTER NOTE
"Cynthia,  Your lab results are all back from this morning now and show that your blood sugar and cholesterol values are both higher than previously.  Your blood sugar is now getting up into a \"borderline diabetes\" range.  Your electrolytes and kidney tests and blood counts look fine.  Healthy eating and regular exercise to achieve some weight loss would be appropriate treatment for your elevated blood sugar.  I would advise another recheck in 1 year.    Albert Grijalva MD  "

## 2023-01-20 NOTE — PATIENT INSTRUCTIONS
Patient Education   Personalized Prevention Plan  You are due for the preventive services outlined below.  Your care team is available to assist you in scheduling these services.  If you have already completed any of these items, please share that information with your care team to update in your medical record.  Health Maintenance Due   Topic Date Due     Osteoporosis Screening  Never done     Pneumococcal Vaccine (1 - PCV) Never done     Annual Wellness Visit  10/28/2022     Cholesterol Lab  10/28/2022     ANNUAL REVIEW OF HM ORDERS  10/28/2022     Mammogram  11/02/2022

## 2023-01-20 NOTE — PROGRESS NOTES
"SUBJECTIVE:   Cynthia is a 67 year old who presents for a Preventive Visit and follow-up on baseline health conditions.    Patient has been advised of split billing requirements and indicates understanding: Yes  Are you in the first 12 months of your Medicare coverage?  Yes,  Visual Acuity:  Right Eye: 20/125   Left Eye: 20/32  Both Eyes: 20/25    Healthy Habits:     In general, how would you rate your overall health?  Good    Frequency of exercise:  4-5 days/week    Duration of exercise:  30-45 minutes    Do you usually eat at least 4 servings of fruit and vegetables a day, include whole grains    & fiber and avoid regularly eating high fat or \"junk\" foods?  Yes    Taking medications regularly:  Yes    Ability to successfully perform activities of daily living:  No assistance needed    Home Safety:  No safety concerns identified    Hearing Impairment:  No hearing concerns    In the past 6 months, have you been bothered by leaking of urine?  No    In general, how would you rate your overall mental or emotional health?  Good      PHQ-2 Total Score: 0    Additional concerns today:  No      Have you ever done Advance Care Planning? (For example, a Health Directive, POLST, or a discussion with a medical provider or your loved ones about your wishes): No, advance care planning information given to patient to review.  Patient declined advance care planning discussion at this time.       Fall risk  Fallen 2 or more times in the past year?: No  Any fall with injury in the past year?: No    Cognitive Screening   1) Repeat 3 items (Leader, Season, Table)    2) Clock draw: NORMAL  3) 3 item recall: Recalls 3 objects  Results: NORMAL clock, 1-2 items recalled: COGNITIVE IMPAIRMENT LESS LIKELY    Mini-CogTM Copyright [employer]. Licensed by the author for use in [place]; reprinted with permission (devante@.[facility]). All rights reserved.      Do you have sleep apnea, excessive snoring or daytime drowsiness?: " yes    Reviewed and updated as needed this visit by clinical staff                  Reviewed and updated as needed this visit by Provider                 Social History     Tobacco Use     Smoking status: Never     Smokeless tobacco: Never   Substance Use Topics     Alcohol use: Yes     Comment: 7-14/ week, wine     If you drink alcohol do you typically have >3 drinks per day or >7 drinks per week? No    Alcohol Use 1/18/2023   Prescreen: >3 drinks/day or >7 drinks/week? No     She remains on amlodipine for hypertension and rosuvastatin for hyperlipidemia.  She admits that her diet and exercise habits have not been very good lately.  She has gained some weight again.  She still has some right hip pain, and has also had left knee pain lately as well.  She has known severe arthritis of the right hip.  She does not want to do anything about that her knee at this time, however, because she has a new job.  She does have an exercise bike at home that she uses periodically.    She has a mammogram set up for later this morning.    Current providers sharing in care for this patient include:   Patient Care Team:  Albert Grijalva MD as PCP - General (Family Practice)  Albert Grijalva MD as Assigned PCP    The following health maintenance items are reviewed in Epic and correct as of today:  Health Maintenance   Topic Date Due     DEXA  Never done     Pneumococcal Vaccine: 65+ Years (1 - PCV) Never done     MEDICARE ANNUAL WELLNESS VISIT  10/28/2022     LIPID  10/28/2022     ANNUAL REVIEW OF HM ORDERS  10/28/2022     MAMMO SCREENING  11/02/2022     ADVANCE CARE PLANNING  08/09/2023     COLORECTAL CANCER SCREENING  08/31/2023     FALL RISK ASSESSMENT  01/20/2024     DTAP/TDAP/TD IMMUNIZATION (2 - Td or Tdap) 08/09/2028     HEPATITIS C SCREENING  Completed     HIV SCREENING  Completed     PHQ-2 (once per calendar year)  Completed     INFLUENZA VACCINE  Completed     ZOSTER IMMUNIZATION  Completed     COVID-19 Vaccine  Completed      IPV IMMUNIZATION  Aged Out     MENINGITIS IMMUNIZATION  Aged Out     PAP  Discontinued     Patient Active Problem List   Diagnosis     Hyperlipidemia LDL goal <130     Impaired fasting glucose     Hypertension with goal blood pressure less than 140/90     Primary osteoarthritis of right hip     Obesity (BMI 30.0-34.9)     Left knee pain, unspecified chronicity     No past surgical history on file.    Social History     Tobacco Use     Smoking status: Never     Smokeless tobacco: Never   Substance Use Topics     Alcohol use: Yes     Comment: 7-14/ week, wine     Family History   Problem Relation Age of Onset     Prostate Cancer Father      Hyperlipidemia Brother      Prostate Cancer Brother          Current Outpatient Medications   Medication Sig Dispense Refill     acetaminophen (TYLENOL) 325 MG tablet Take 325-650 mg by mouth every 6 hours as needed for mild pain       amLODIPine (NORVASC) 10 MG tablet Take 1 tablet (10 mg) by mouth daily 90 tablet 3     Cholecalciferol (VITAMIN D3) 25 MCG (1000 UT) CAPS        rosuvastatin (CRESTOR) 10 MG tablet Take 1 tablet (10 mg) by mouth daily 90 tablet 3     GLUCOSAMINE-CHONDROITIN PO        Allergies   Allergen Reactions     Pcn [Penicillins]          Breast CA Risk Assessment (FHS-7) 10/21/2021   Do you have a family history of breast, colon, or ovarian cancer? No / Unknown         Mammogram Screening: Recommended mammography every 1-2 years with patient discussion and risk factor consideration  Pertinent mammograms are reviewed under the imaging tab.    Review of Systems   Constitutional: Negative for chills and fever.   HENT: Negative for congestion, ear pain, hearing loss and sore throat.    Eyes: Negative for pain and visual disturbance.   Respiratory: Negative for cough and shortness of breath.    Cardiovascular: Positive for peripheral edema. Negative for chest pain and palpitations.   Gastrointestinal: Negative for abdominal pain, constipation, diarrhea,  heartburn, hematochezia and nausea.   Breasts:  Negative for tenderness, breast mass and discharge.   Genitourinary: Negative for dysuria, frequency, genital sores, hematuria, pelvic pain, urgency, vaginal bleeding and vaginal discharge.   Musculoskeletal: Positive for arthralgias and joint swelling. Negative for myalgias.   Skin: Negative for rash.   Neurological: Positive for headaches. Negative for dizziness, weakness and paresthesias.   Psychiatric/Behavioral: Negative for mood changes. The patient is not nervous/anxious.          OBJECTIVE:   /78 (BP Location: Right arm, Patient Position: Sitting, Cuff Size: Adult Regular)   Pulse 103   Temp 99.1  F (37.3  C) (Oral)   Ht 1.524 m (5')   Wt 73.9 kg (163 lb)   SpO2 98%   BMI 31.83 kg/m   Estimated body mass index is 31.83 kg/m  as calculated from the following:    Height as of this encounter: 1.524 m (5').    Weight as of this encounter: 73.9 kg (163 lb).  Physical Exam  GENERAL: Pleasant, alert, no distress and over weight  EYES: Eyes grossly normal to inspection, PERRL and conjunctivae and sclerae normal  HENT: Grossly normal  NECK: no adenopathy, no asymmetry, masses, or scars and thyroid normal to palpation  RESP: lungs clear to auscultation - no rales, rhonchi or wheezes  CV: regular rate and rhythm, normal S1 S2, no S3 or S4, no murmur, click or rub, no peripheral edema and peripheral pulses strong  ABDOMEN: soft, nontender, no hepatosplenomegaly, no masses and bowel sounds normal  MS: no gross musculoskeletal defects noted, no edema  SKIN: no suspicious lesions or rashes  NEURO: Normal strength and tone, mentation intact and speech normal  PSYCH: mentation appears normal, affect normal/bright    Diagnostic Test Results:  Labs reviewed in Epic    ASSESSMENT / PLAN:       ICD-10-CM    1. Encounter for Medicare annual wellness exam  Z00.00       2. Hyperlipidemia LDL goal <130  E78.5 Lipid panel reflex to direct LDL Fasting     rosuvastatin  (CRESTOR) 10 MG tablet      3. Impaired fasting glucose  R73.01 Basic metabolic panel     Hemoglobin A1c      4. Hypertension with goal blood pressure less than 140/90  I10 amLODIPine (NORVASC) 10 MG tablet     CBC with platelets      5. Primary osteoarthritis of right hip  M16.11       6. Left knee pain, unspecified chronicity  M25.562       7. Obesity (BMI 30.0-34.9)  E66.9       8. Asymptomatic postmenopausal status  Z78.0 DEXA HIP/PELVIS/SPINE - Future      9. Need for pneumococcal vaccination  Z23 Pneumococcal 20 Valent Conjugate (PCV20)        Blood pressure and other vital signs look acceptable  We discussed the above items  We will check fasting labs today as above  Continue same baseline meds  Continue conservative care for the right hip and left knee pain including regular use of the exercise bike  She was encouraged on diet and exercise treatment for weight loss  She will have a mammogram later this morning and I will also order a DEXA scan for her to be done in the near future as well  We will give her a Prevnar 20 vaccine  Plan a recheck in 1 year, or sooner prn      COUNSELING:  Reviewed preventive health counseling, as reflected in patient instructions       Regular exercise       Healthy diet/nutrition       Immunizations    Vaccinated for: Pneumococcal              She reports that she has never smoked. She has never used smokeless tobacco.      Appropriate preventive services were discussed with this patient, including applicable screening as appropriate for cardiovascular disease, diabetes, osteopenia/osteoporosis, and glaucoma.  As appropriate for age/gender, discussed screening for colorectal cancer, prostate cancer, breast cancer, and cervical cancer. Checklist reviewing preventive services available has been given to the patient.    Reviewed patients plan of care and provided an AVS. The Basic Care Plan (routine screening as documented in Health Maintenance) for Cynthia meets the Care Plan  requirement. This Care Plan has been established and reviewed with the Patient.      Albert Grijalva MD  [facility]    Identified Health Risks:

## 2023-03-07 ENCOUNTER — ANCILLARY PROCEDURE (OUTPATIENT)
Dept: BONE DENSITY | Facility: CLINIC | Age: 68
End: 2023-03-07
Attending: FAMILY MEDICINE
Payer: COMMERCIAL

## 2023-03-07 DIAGNOSIS — Z78.0 ASYMPTOMATIC POSTMENOPAUSAL STATUS: ICD-10-CM

## 2023-03-07 PROCEDURE — 77085 DXA BONE DENSITY AXL VRT FX: CPT | Performed by: INTERNAL MEDICINE

## 2023-03-12 PROBLEM — M85.89 OSTEOPENIA OF MULTIPLE SITES: Status: ACTIVE | Noted: 2023-03-12

## 2023-03-12 NOTE — RESULT ENCOUNTER NOTE
Cynthia,  Your DEXA bone density test shows that your bones are mildly thin in a range called osteopenia but not so thin is to be considered osteoporosis.  Getting regular physical exercise and taking some extra calcium and vitamin D would be prudent to try to help keep your bones strong.    Albert Grijalva MD

## 2023-08-27 DIAGNOSIS — I10 HYPERTENSION WITH GOAL BLOOD PRESSURE LESS THAN 140/90: ICD-10-CM

## 2023-08-28 RX ORDER — AMLODIPINE BESYLATE 10 MG/1
10 TABLET ORAL DAILY
Qty: 90 TABLET | Refills: 3 | Status: SHIPPED | OUTPATIENT
Start: 2023-08-28 | End: 2024-02-21

## 2024-01-08 ENCOUNTER — PATIENT OUTREACH (OUTPATIENT)
Dept: CARE COORDINATION | Facility: CLINIC | Age: 69
End: 2024-01-08
Payer: COMMERCIAL

## 2024-02-07 ENCOUNTER — TRANSFERRED RECORDS (OUTPATIENT)
Dept: HEALTH INFORMATION MANAGEMENT | Facility: CLINIC | Age: 69
End: 2024-02-07
Payer: COMMERCIAL

## 2024-02-21 DIAGNOSIS — I10 HYPERTENSION WITH GOAL BLOOD PRESSURE LESS THAN 140/90: ICD-10-CM

## 2024-02-21 DIAGNOSIS — E78.5 HYPERLIPIDEMIA LDL GOAL <130: ICD-10-CM

## 2024-02-21 RX ORDER — AMLODIPINE BESYLATE 10 MG/1
10 TABLET ORAL DAILY
Qty: 90 TABLET | Refills: 0 | Status: SHIPPED | OUTPATIENT
Start: 2024-02-21 | End: 2024-05-31

## 2024-02-21 RX ORDER — ROSUVASTATIN CALCIUM 10 MG/1
10 TABLET, COATED ORAL DAILY
Qty: 90 TABLET | Refills: 0 | Status: SHIPPED | OUTPATIENT
Start: 2024-02-21 | End: 2024-05-25

## 2024-03-16 ENCOUNTER — HEALTH MAINTENANCE LETTER (OUTPATIENT)
Age: 69
End: 2024-03-16

## 2024-04-09 SDOH — HEALTH STABILITY: PHYSICAL HEALTH: ON AVERAGE, HOW MANY DAYS PER WEEK DO YOU ENGAGE IN MODERATE TO STRENUOUS EXERCISE (LIKE A BRISK WALK)?: 3 DAYS

## 2024-04-09 SDOH — HEALTH STABILITY: PHYSICAL HEALTH: ON AVERAGE, HOW MANY MINUTES DO YOU ENGAGE IN EXERCISE AT THIS LEVEL?: 30 MIN

## 2024-04-09 ASSESSMENT — SOCIAL DETERMINANTS OF HEALTH (SDOH): HOW OFTEN DO YOU GET TOGETHER WITH FRIENDS OR RELATIVES?: PATIENT DECLINED

## 2024-04-10 ENCOUNTER — OFFICE VISIT (OUTPATIENT)
Dept: FAMILY MEDICINE | Facility: CLINIC | Age: 69
End: 2024-04-10
Payer: COMMERCIAL

## 2024-04-10 VITALS
OXYGEN SATURATION: 96 % | SYSTOLIC BLOOD PRESSURE: 108 MMHG | WEIGHT: 159 LBS | BODY MASS INDEX: 31.22 KG/M2 | DIASTOLIC BLOOD PRESSURE: 77 MMHG | HEIGHT: 60 IN | HEART RATE: 85 BPM | TEMPERATURE: 98.6 F

## 2024-04-10 DIAGNOSIS — E78.5 HYPERLIPIDEMIA LDL GOAL <130: ICD-10-CM

## 2024-04-10 DIAGNOSIS — H25.89 OTHER AGE-RELATED CATARACT OF BOTH EYES: ICD-10-CM

## 2024-04-10 DIAGNOSIS — Z00.00 ENCOUNTER FOR ANNUAL WELLNESS EXAM IN MEDICARE PATIENT: Primary | ICD-10-CM

## 2024-04-10 DIAGNOSIS — E66.811 OBESITY (BMI 30.0-34.9): ICD-10-CM

## 2024-04-10 DIAGNOSIS — M16.11 PRIMARY OSTEOARTHRITIS OF RIGHT HIP: ICD-10-CM

## 2024-04-10 DIAGNOSIS — Z01.818 PREOP GENERAL PHYSICAL EXAM: ICD-10-CM

## 2024-04-10 DIAGNOSIS — R73.01 IMPAIRED FASTING GLUCOSE: ICD-10-CM

## 2024-04-10 DIAGNOSIS — Z12.11 SCREEN FOR COLON CANCER: ICD-10-CM

## 2024-04-10 DIAGNOSIS — M85.89 OSTEOPENIA OF MULTIPLE SITES: ICD-10-CM

## 2024-04-10 DIAGNOSIS — M25.562 LEFT KNEE PAIN, UNSPECIFIED CHRONICITY: ICD-10-CM

## 2024-04-10 DIAGNOSIS — I10 HYPERTENSION WITH GOAL BLOOD PRESSURE LESS THAN 140/90: ICD-10-CM

## 2024-04-10 LAB
ANION GAP SERPL CALCULATED.3IONS-SCNC: 13 MMOL/L (ref 7–15)
BUN SERPL-MCNC: 13.9 MG/DL (ref 8–23)
CALCIUM SERPL-MCNC: 9.5 MG/DL (ref 8.8–10.2)
CHLORIDE SERPL-SCNC: 104 MMOL/L (ref 98–107)
CHOLEST SERPL-MCNC: 168 MG/DL
CREAT SERPL-MCNC: 0.6 MG/DL (ref 0.51–0.95)
DEPRECATED HCO3 PLAS-SCNC: 21 MMOL/L (ref 22–29)
EGFRCR SERPLBLD CKD-EPI 2021: >90 ML/MIN/1.73M2
ERYTHROCYTE [DISTWIDTH] IN BLOOD BY AUTOMATED COUNT: 12.3 % (ref 10–15)
FASTING STATUS PATIENT QL REPORTED: YES
GLUCOSE SERPL-MCNC: 118 MG/DL (ref 70–99)
HBA1C MFR BLD: 5.6 % (ref 0–5.6)
HCT VFR BLD AUTO: 39 % (ref 35–47)
HDLC SERPL-MCNC: 54 MG/DL
HGB BLD-MCNC: 13.4 G/DL (ref 11.7–15.7)
LDLC SERPL CALC-MCNC: 89 MG/DL
MCH RBC QN AUTO: 31 PG (ref 26.5–33)
MCHC RBC AUTO-ENTMCNC: 34.4 G/DL (ref 31.5–36.5)
MCV RBC AUTO: 90 FL (ref 78–100)
NONHDLC SERPL-MCNC: 114 MG/DL
PLATELET # BLD AUTO: 450 10E3/UL (ref 150–450)
POTASSIUM SERPL-SCNC: 3.9 MMOL/L (ref 3.4–5.3)
RBC # BLD AUTO: 4.32 10E6/UL (ref 3.8–5.2)
SODIUM SERPL-SCNC: 138 MMOL/L (ref 135–145)
TRIGL SERPL-MCNC: 126 MG/DL
WBC # BLD AUTO: 5.6 10E3/UL (ref 4–11)

## 2024-04-10 PROCEDURE — 80048 BASIC METABOLIC PNL TOTAL CA: CPT | Performed by: FAMILY MEDICINE

## 2024-04-10 PROCEDURE — 99214 OFFICE O/P EST MOD 30 MIN: CPT | Mod: 25 | Performed by: FAMILY MEDICINE

## 2024-04-10 PROCEDURE — 85027 COMPLETE CBC AUTOMATED: CPT | Performed by: FAMILY MEDICINE

## 2024-04-10 PROCEDURE — 36415 COLL VENOUS BLD VENIPUNCTURE: CPT | Performed by: FAMILY MEDICINE

## 2024-04-10 PROCEDURE — 83036 HEMOGLOBIN GLYCOSYLATED A1C: CPT | Performed by: FAMILY MEDICINE

## 2024-04-10 PROCEDURE — G0438 PPPS, INITIAL VISIT: HCPCS | Performed by: FAMILY MEDICINE

## 2024-04-10 PROCEDURE — 80061 LIPID PANEL: CPT | Performed by: FAMILY MEDICINE

## 2024-04-10 RX ORDER — TIMOLOL MALEATE 5 MG/ML
1 SOLUTION/ DROPS OPHTHALMIC 2 TIMES DAILY
COMMUNITY
Start: 2024-04-10

## 2024-04-10 ASSESSMENT — PAIN SCALES - GENERAL: PAINLEVEL: NO PAIN (0)

## 2024-04-10 NOTE — PROGRESS NOTES
Preventive Care Visit  [facility]  Albert Grijalva MD, Family Medicine  Apr 10, 2024      Assessment & Plan       ICD-10-CM    1. Encounter for annual wellness exam in Medicare patient  Z00.00       2. Preop general physical exam  Z01.818 CBC with platelets     CBC with platelets      3. Other age-related cataract of both eyes  H25.89       4. Hyperlipidemia LDL goal <130  E78.5 Lipid panel reflex to direct LDL Fasting     Lipid panel reflex to direct LDL Fasting      5. Impaired fasting glucose  R73.01 Basic metabolic panel     Hemoglobin A1c     Basic metabolic panel     Hemoglobin A1c      6. Hypertension with goal blood pressure less than 140/90  I10       7. Primary osteoarthritis of right hip  M16.11       8. Left knee pain, unspecified chronicity  M25.562       9. Obesity (BMI 30.0-34.9)  E66.9       10. Osteopenia of multiple sites  M85.89       11. Screen for colon cancer  Z12.11 Colonoscopy Screening  Referral        Blood pressure and other vital signs look good  We discussed the above items  We will check fasting labs today as above  Continue same baseline meds  Continue conservative care for right hip and left knee arthritis  She was reminded to schedule her 5-year follow-up colonoscopy and she plans to do that sometime this summer  She was advised to get a repeat mammogram sometime before January of this next year      Plan a tentative recheck in 1 year, or sooner prn    BMI  Estimated body mass index is 31.05 kg/m  as calculated from the following:    Height as of this encounter: 1.524 m (5').    Weight as of this encounter: 72.1 kg (159 lb).   Weight management plan: Discussed healthy diet and exercise guidelines    Counseling  Appropriate preventive services were discussed with this patient, including applicable screening as appropriate for fall prevention, nutrition, physical activity, Tobacco-use cessation, weight loss and cognition.  Checklist reviewing preventive  services available has been given to the patient.  Reviewed patient's diet, addressing concerns and/or questions.   She is at risk for lack of exercise and has been provided with information to increase physical activity for the benefit of her well-being.           Jerica Marie is a 68 year old, presenting for the following:  Physical          4/10/2024     9:38 AM   Additional Questions   Roomed by cam   Accompanied by none         4/10/2024     9:38 AM   Patient Reported Additional Medications   Patient reports taking the following new medications Timolol         Health Care Directive  Patient does not have a Health Care Directive or Living Will: Discussed advance care planning with patient; however, patient declined at this time.    HPI    She is here today for routine annual wellness check as well as a preoperative history and physical before undergoing cataract removals.  I will document the preoperative history and physical in a separate note.  She remains on amlodipine for hypertension and rosuvastatin for hyperlipidemia.  She is also taking timolol eyedrops now for left eye glaucoma.  She is on no other prescription medications.  She uses acetaminophen for chronic right hip and left knee pain.  She is thinking of retiring this summer and may pursue right hip replacement sometime after that.    She is overdue now for a 5-year follow-up colonoscopy.  She is aware of that and plans to schedule it for the summer.      4/9/2024   General Health   How would you rate your overall physical health? Good   Feel stress (tense, anxious, or unable to sleep) Patient declined         4/9/2024   Nutrition   Diet: Low salt    Low fat/cholesterol         4/9/2024   Exercise   Days per week of moderate/strenous exercise 3 days   Average minutes spent exercising at this level 30 min         4/9/2024   Social Factors   Frequency of gathering with friends or relatives Patient declined   Worry food won't last until get  money to buy more No   Food not last or not have enough money for food? No   Do you have housing?  Yes   Are you worried about losing your housing? No   Lack of transportation? No   Unable to get utilities (heat,electricity)? No         4/10/2024   Fall Risk   Fallen 2 or more times in the past year? No   Trouble with walking or balance? No          4/9/2024   Activities of Daily Living- Home Safety   Needs help with the following daily activites None of the above   Safety concerns in the home None of the above         4/9/2024   Dental   Dentist two times every year? (!) DECLINE         4/9/2024   Hearing Screening   Hearing concerns? None of the above         4/9/2024   Driving Risk Screening   Patient/family members have concerns about driving No         4/9/2024   General Alertness/Fatigue Screening   Have you been more tired than usual lately? (!) DECLINE         4/9/2024   Urinary Incontinence Screening   Bothered by leaking urine in past 6 months No         4/9/2024   TB Screening   Were you born outside of the [place]? No         Today's PHQ-2 Score:       4/9/2024     4:41 PM   PHQ-2 ( 1999 [employer])   Q1: Little interest or pleasure in doing things 0   Q2: Feeling down, depressed or hopeless 0   PHQ-2 Score 0    0   Q1: Little interest or pleasure in doing things Not at all   Q2: Feeling down, depressed or hopeless Not at all   PHQ-2 Score 0           4/9/2024   Substance Use   Alcohol more than 3/day or more than 7/wk No   Do you have a current opioid prescription? No   How severe/bad is pain from 1 to 10? 3/10   Do you use any other substances recreationally? No     Social History     Tobacco Use    Smoking status: Never    Smokeless tobacco: Never   Vaping Use    Vaping status: Never Used   Substance Use Topics    Alcohol use: Yes     Comment: 7-14/ week, wine    Drug use: No           1/20/2023   LAST FHS-7 RESULTS   1st degree relative breast or ovarian cancer No   Any relative bilateral breast cancer No    Any male have breast cancer No   Any ONE woman have BOTH breast AND ovarian cancer No   Any woman with breast cancer before 50yrs No   2 or more relatives with breast AND/OR ovarian cancer No   2 or more relatives with breast AND/OR bowel cancer No        Mammogram Screening - Mammogram every 1-2 years updated in Health Maintenance based on mutual decision making    ASCVD Risk   The 10-year ASCVD risk score (Pedro NAVARRO, et al., 2019) is: 5.7%    Values used to calculate the score:      Age: 68 years      Sex: Female      Is Non- : No      Diabetic: No      Tobacco smoker: No      Systolic Blood Pressure: 108 mmHg      Is BP treated: Yes      HDL Cholesterol: 57 mg/dL      Total Cholesterol: 191 mg/dL            Reviewed and updated as needed this visit by Provider                    Patient Active Problem List   Diagnosis    Hyperlipidemia LDL goal <130    Impaired fasting glucose    Hypertension with goal blood pressure less than 140/90    Primary osteoarthritis of right hip    Obesity (BMI 30.0-34.9)    Left knee pain, unspecified chronicity    Osteopenia of multiple sites     No past surgical history on file.    Social History     Tobacco Use    Smoking status: Never    Smokeless tobacco: Never   Substance Use Topics    Alcohol use: Yes     Comment: 7-14/ week, wine     Family History   Problem Relation Age of Onset    Prostate Cancer Father     Hyperlipidemia Brother     Prostate Cancer Brother          Current Outpatient Medications   Medication Sig Dispense Refill    acetaminophen (TYLENOL) 325 MG tablet Take 325-650 mg by mouth every 6 hours as needed for mild pain      amLODIPine (NORVASC) 10 MG tablet TAKE 1 TABLET(10 MG) BY MOUTH DAILY 90 tablet 0    Cholecalciferol (VITAMIN D3) 25 MCG (1000 UT) CAPS       rosuvastatin (CRESTOR) 10 MG tablet TAKE 1 TABLET(10 MG) BY MOUTH DAILY 90 tablet 0    timolol maleate (TIMOPTIC) 0.5 % ophthalmic solution Place 1 drop Into the left  eye 2 times daily       Allergies   Allergen Reactions    Pcn [Penicillins]      Current providers sharing in care for this patient include:  Patient Care Team:  Albert Grijalva MD as PCP - General (Family Practice)  Albert Grijalva MD as Assigned PCP    The following health maintenance items are reviewed in Epic and correct as of today:  Health Maintenance   Topic Date Due    RSV VACCINE (Pregnancy & 60+) (1 - 1-dose 60+ series) Never done    COLORECTAL CANCER SCREENING  08/31/2023    MEDICARE ANNUAL WELLNESS VISIT  01/20/2024    LIPID  01/20/2024    ANNUAL REVIEW OF HM ORDERS  01/20/2024    MAMMO SCREENING  01/20/2025    FALL RISK ASSESSMENT  04/10/2025    GLUCOSE  01/20/2026    ADVANCE CARE PLANNING  01/20/2028    DTAP/TDAP/TD IMMUNIZATION (2 - Td or Tdap) 08/09/2028    DEXA  03/07/2038    HEPATITIS C SCREENING  Completed    PHQ-2 (once per calendar year)  Completed    INFLUENZA VACCINE  Completed    Pneumococcal Vaccine: 65+ Years  Completed    ZOSTER IMMUNIZATION  Completed    COVID-19 Vaccine  Completed    IPV IMMUNIZATION  Aged Out    HPV IMMUNIZATION  Aged Out    MENINGITIS IMMUNIZATION  Aged Out    RSV MONOCLONAL ANTIBODY  Aged Out    PAP  Discontinued         Review of Systems  Mainly significant for the chronic right hip and left knee pain.  She also has significantly decreased vision in the right eye and to a lesser extent the left eye.    Constitutional, neuro, ENT, endocrine, pulmonary, cardiac, gastrointestinal, genitourinary, musculoskeletal, integument and psychiatric systems are negative, except as otherwise noted.     Objective    Exam  /77 (BP Location: Left arm, Patient Position: Sitting, Cuff Size: Adult Regular)   Pulse 85   Temp 98.6  F (37  C) (Temporal)   Ht 1.524 m (5')   Wt 72.1 kg (159 lb)   SpO2 96%   BMI 31.05 kg/m     Estimated body mass index is 31.05 kg/m  as calculated from the following:    Height as of this encounter: 1.524 m (5').    Weight as of this encounter:  72.1 kg (159 lb).    Physical Exam  GENERAL: alert and no distress  EYES: Bilateral cataracts, eyes otherwise grossly normal to inspection, PERRL and conjunctivae and sclerae normal  HENT: ear canals and TM's normal  NECK: no adenopathy, no asymmetry, masses, or scars  RESP: lungs clear to auscultation - no rales, rhonchi or wheezes  CV: regular rate and rhythm, normal S1 S2, no S3 or S4, no murmur, click or rub, no peripheral edema  ABDOMEN: soft, nontender, no hepatosplenomegaly, no masses   MS: no gross musculoskeletal defects noted, no edema  SKIN: no suspicious lesions or rashes  NEURO: Normal strength and tone, mentation intact and speech normal  PSYCH: mentation appears normal, affect normal/bright        4/10/2024   Mini Cog   Clock Draw Score 2 Normal   3 Item Recall 3 objects recalled   Mini Cog Total Score 5              Signed Electronically by: Albert Grijalva MD

## 2024-04-10 NOTE — Clinical Note
Please fax a copy of her preoperative history and physical.  There were 2 office notes from today at this visit and you do NOT need to fax a copy of her preventive annual wellness exam (just the preop).

## 2024-04-10 NOTE — PROGRESS NOTES
[facility] Evaluation  [facility]  [address]  [place] [address]  Phone: [phone]  Primary Provider: Carmen Huerta  Pre-op Performing Provider: CARMEN HUERTA  Apr 10, 2024       Cynthia is a 68 year old, presenting for the following:  Physical  (Including preoperative H&P)      4/10/2024     9:38 AM   Additional Questions   Roomed by cam   Accompanied by none         4/10/2024     9:38 AM   Patient Reported Additional Medications   Patient reports taking the following new medications Timolol     Surgical Information  Surgery/Procedure: Right cataract and then the left a few weeks laer  Surgery Location: [facility]  Surgeon: Dr. Nixon  Surgery Date: right 04/23/24 then left 05/13/24  Time of Surgery:   Where patient plans to recover: At home with family  Fax number for surgical facility: [phone]    Assessment & Plan     The proposed surgical procedure is considered LOW risk.      ICD-10-CM    1. Encounter for annual wellness exam in Medicare patient  Z00.00       2. Preop general physical exam  Z01.818 CBC with platelets     CBC with platelets      3. Other age-related cataract of both eyes  H25.89       4. Hyperlipidemia LDL goal <130  E78.5 Lipid panel reflex to direct LDL Fasting     Lipid panel reflex to direct LDL Fasting      5. Impaired fasting glucose  R73.01 Basic metabolic panel     Hemoglobin A1c     Basic metabolic panel     Hemoglobin A1c      6. Hypertension with goal blood pressure less than 140/90  I10       7. Primary osteoarthritis of right hip  M16.11       8. Left knee pain, unspecified chronicity  M25.562       9. Obesity (BMI 30.0-34.9)  E66.9       10. Osteopenia of multiple sites  M85.89       11. Screen for colon cancer  Z12.11 Colonoscopy Screening  Referral           - No identified additional risk factors other than previously addressed    Antiplatelet or Anticoagulation Medication Instructions   - Patient is on no  antiplatelet or anticoagulation medications.    Additional Medication Instructions  Patient is to take all scheduled medications on the day of surgery    Recommendation  APPROVAL GIVEN to proceed with proposed procedure, without further diagnostic evaluation.        Subjective       HPI related to upcoming procedure: 68-year-old white female has bilateral visually significant cataracts and is coming in now for removal of the right cataract first followed by removal of a left cataract 3 weeks later along with a stent placement in the left eye for glaucoma.          4/9/2024     4:47 PM   Preop Questions   1. Have you ever had a heart attack or stroke? No   2. Have you ever had surgery on your heart or blood vessels, such as a stent placement, a coronary artery bypass, or surgery on an artery in your head, neck, heart, or legs? No   3. Do you have chest pain with activity? No   4. Do you have a history of  heart failure? No   5. Do you currently have a cold, bronchitis or symptoms of other infection? No   6. Do you have a cough, shortness of breath, or wheezing? No   7. Do you or anyone in your family have previous history of blood clots? YES - mother and oldest brother   8. Do you or does anyone in your family have a serious bleeding problem such as prolonged bleeding following surgeries or cuts? No   9. Have you ever had problems with anemia or been told to take iron pills? No   10. Have you had any abnormal blood loss such as black, tarry or bloody stools, or abnormal vaginal bleeding? No   11. Have you ever had a blood transfusion? No   12. Are you willing to have a blood transfusion if it is medically needed before, during, or after your surgery? Yes   13. Have you or any of your relatives ever had problems with anesthesia? No   14. Do you have sleep apnea, excessive snoring or daytime drowsiness? No   15. Do you have any artifical heart valves or other implanted medical devices like a pacemaker, defibrillator,  or continuous glucose monitor? No   16. Do you have artificial joints? No   17. Are you allergic to latex? No         Status of Chronic Conditions:  See problem list for active medical problems.  Problems all longstanding and stable, except as noted/documented.  See ROS for pertinent symptoms related to these conditions.    Patient Active Problem List    Diagnosis Date Noted    Osteopenia of multiple sites 03/12/2023     Priority: Medium    Obesity (BMI 30.0-34.9) 01/20/2023     Priority: Medium    Left knee pain, unspecified chronicity 01/20/2023     Priority: Medium    Primary osteoarthritis of right hip 09/13/2018     Priority: Medium    Hypertension with goal blood pressure less than 140/90 08/09/2018     Priority: Medium    Hyperlipidemia LDL goal <130      Priority: Medium    Impaired fasting glucose      Priority: Medium      Past Medical History:   Diagnosis Date    Hyperlipidemia LDL goal <130     Hypertension with goal blood pressure less than 140/90 8/9/2018    Impaired fasting glucose     Osteopenia of multiple sites 3/12/2023     No past surgical history on file.  Current Outpatient Medications   Medication Sig Dispense Refill    acetaminophen (TYLENOL) 325 MG tablet Take 325-650 mg by mouth every 6 hours as needed for mild pain      amLODIPine (NORVASC) 10 MG tablet TAKE 1 TABLET(10 MG) BY MOUTH DAILY 90 tablet 0    Cholecalciferol (VITAMIN D3) 25 MCG (1000 UT) CAPS       rosuvastatin (CRESTOR) 10 MG tablet TAKE 1 TABLET(10 MG) BY MOUTH DAILY 90 tablet 0       Allergies   Allergen Reactions    Pcn [Penicillins]         Social History     Tobacco Use    Smoking status: Never    Smokeless tobacco: Never   Substance Use Topics    Alcohol use: Yes     Comment: 7-14/ week, wine     Family History   Problem Relation Age of Onset    Prostate Cancer Father     Hyperlipidemia Brother     Prostate Cancer Brother      History   Drug Use No         Review of Systems    Review of Systems  She has advanced right hip  arthritis and also chronic left knee pain.  Constitutional, neuro, ENT, endocrine, pulmonary, cardiac, gastrointestinal, genitourinary, musculoskeletal, integument and psychiatric systems are negative, except as otherwise noted.    Objective    /77 (BP Location: Left arm, Patient Position: Sitting, Cuff Size: Adult Regular)   Pulse 85   Temp 98.6  F (37  C) (Temporal)   Ht 1.524 m (5')   Wt 72.1 kg (159 lb)   SpO2 96%   BMI 31.05 kg/m     Estimated body mass index is 31.05 kg/m  as calculated from the following:    Height as of this encounter: 1.524 m (5').    Weight as of this encounter: 72.1 kg (159 lb).  Physical Exam  GENERAL: alert and no distress  EYES: Bilateral cataracts, eyes otherwise grossly normal to inspection, PERRL and conjunctivae and sclerae normal  HENT: ear canals and TM's normal  NECK: no adenopathy, no asymmetry, masses, or scars  RESP: lungs clear to auscultation - no rales, rhonchi or wheezes  CV: regular rate and rhythm, normal S1 S2, no S3 or S4, no murmur, click or rub, no peripheral edema  ABDOMEN: soft, nontender, no hepatosplenomegaly, no masses   MS: no gross musculoskeletal defects noted, no edema  SKIN: no suspicious lesions or rashes  NEURO: Normal strength and tone, mentation intact and speech normal  PSYCH: mentation appears normal, affect normal/bright    Recent Labs   Lab Test 01/20/23  1051   HGB 13.6   *      POTASSIUM 4.3   CR 0.55   A1C 5.9*        Diagnostics  Labs pending at this time.  Results will be reviewed when available.   No EKG required for low risk surgery (cataract, skin procedure, breast biopsy, etc).    Revised Cardiac Risk Index (RCRI)  The patient has the following serious cardiovascular risks for perioperative complications:   - No serious cardiac risks = 0 points     RCRI Interpretation: 0 points: Class I (very low risk - 0.4% complication rate)         Signed Electronically by: Albert Grijalva MD  Copy of this evaluation report is  provided to requesting physician.

## 2024-04-11 NOTE — RESULT ENCOUNTER NOTE
Cynthia,  These lab results look nicely improved from last year!  Your fasting glucose and 3-month blood sugar average A1c test are both lower than last year, while the rest of your labs are all nice and healthy including electrolytes, kidney tests, lipid values, and blood counts.  Keep up the good work!    Albert Grijalva MD

## 2024-05-07 ENCOUNTER — TRANSFERRED RECORDS (OUTPATIENT)
Dept: HEALTH INFORMATION MANAGEMENT | Facility: CLINIC | Age: 69
End: 2024-05-07
Payer: COMMERCIAL

## 2024-05-20 ENCOUNTER — TRANSFERRED RECORDS (OUTPATIENT)
Dept: HEALTH INFORMATION MANAGEMENT | Facility: CLINIC | Age: 69
End: 2024-05-20
Payer: COMMERCIAL

## 2024-05-25 DIAGNOSIS — E78.5 HYPERLIPIDEMIA LDL GOAL <130: ICD-10-CM

## 2024-05-25 RX ORDER — ROSUVASTATIN CALCIUM 10 MG/1
10 TABLET, COATED ORAL DAILY
Qty: 90 TABLET | Refills: 2 | Status: SHIPPED | OUTPATIENT
Start: 2024-05-25

## 2024-05-31 ENCOUNTER — MYC MEDICAL ADVICE (OUTPATIENT)
Dept: FAMILY MEDICINE | Facility: CLINIC | Age: 69
End: 2024-05-31
Payer: COMMERCIAL

## 2024-05-31 DIAGNOSIS — I10 HYPERTENSION WITH GOAL BLOOD PRESSURE LESS THAN 140/90: ICD-10-CM

## 2024-05-31 RX ORDER — AMLODIPINE BESYLATE 10 MG/1
10 TABLET ORAL DAILY
Qty: 90 TABLET | Refills: 0 | Status: SHIPPED | OUTPATIENT
Start: 2024-05-31 | End: 2024-08-27

## 2024-08-26 DIAGNOSIS — I10 HYPERTENSION WITH GOAL BLOOD PRESSURE LESS THAN 140/90: ICD-10-CM

## 2024-08-27 RX ORDER — AMLODIPINE BESYLATE 10 MG/1
10 TABLET ORAL DAILY
Qty: 90 TABLET | Refills: 0 | Status: SHIPPED | OUTPATIENT
Start: 2024-08-27

## 2024-08-30 ENCOUNTER — TRANSFERRED RECORDS (OUTPATIENT)
Dept: HEALTH INFORMATION MANAGEMENT | Facility: CLINIC | Age: 69
End: 2024-08-30
Payer: COMMERCIAL

## 2024-09-10 PROBLEM — Z86.0100 HISTORY OF COLONIC POLYPS: Status: ACTIVE | Noted: 2024-09-10

## 2024-09-11 ENCOUNTER — PATIENT OUTREACH (OUTPATIENT)
Dept: GASTROENTEROLOGY | Facility: CLINIC | Age: 69
End: 2024-09-11
Payer: COMMERCIAL

## 2024-11-25 DIAGNOSIS — I10 HYPERTENSION WITH GOAL BLOOD PRESSURE LESS THAN 140/90: ICD-10-CM

## 2024-11-25 RX ORDER — AMLODIPINE BESYLATE 10 MG/1
10 TABLET ORAL DAILY
Qty: 90 TABLET | Refills: 0 | Status: SHIPPED | OUTPATIENT
Start: 2024-11-25

## 2024-12-23 ENCOUNTER — PATIENT OUTREACH (OUTPATIENT)
Dept: CARE COORDINATION | Facility: CLINIC | Age: 69
End: 2024-12-23
Payer: COMMERCIAL

## 2025-01-31 ENCOUNTER — ANCILLARY PROCEDURE (OUTPATIENT)
Dept: MAMMOGRAPHY | Facility: CLINIC | Age: 70
End: 2025-01-31
Attending: FAMILY MEDICINE
Payer: COMMERCIAL

## 2025-01-31 DIAGNOSIS — Z12.31 VISIT FOR SCREENING MAMMOGRAM: ICD-10-CM

## 2025-02-21 ENCOUNTER — ANCILLARY PROCEDURE (OUTPATIENT)
Dept: MAMMOGRAPHY | Facility: CLINIC | Age: 70
End: 2025-02-21
Attending: FAMILY MEDICINE
Payer: COMMERCIAL

## 2025-02-21 DIAGNOSIS — Z12.31 VISIT FOR SCREENING MAMMOGRAM: ICD-10-CM

## 2025-02-21 PROCEDURE — 77063 BREAST TOMOSYNTHESIS BI: CPT | Mod: TC | Performed by: RADIOLOGY

## 2025-02-21 PROCEDURE — 77067 SCR MAMMO BI INCL CAD: CPT | Mod: TC | Performed by: RADIOLOGY

## 2025-02-22 DIAGNOSIS — E78.5 HYPERLIPIDEMIA LDL GOAL <130: ICD-10-CM

## 2025-02-23 RX ORDER — ROSUVASTATIN CALCIUM 10 MG/1
10 TABLET, COATED ORAL DAILY
Qty: 90 TABLET | Refills: 0 | Status: SHIPPED | OUTPATIENT
Start: 2025-02-23

## 2025-02-24 DIAGNOSIS — I10 HYPERTENSION WITH GOAL BLOOD PRESSURE LESS THAN 140/90: ICD-10-CM

## 2025-02-24 RX ORDER — AMLODIPINE BESYLATE 10 MG/1
10 TABLET ORAL DAILY
Qty: 90 TABLET | Refills: 0 | Status: SHIPPED | OUTPATIENT
Start: 2025-02-24

## 2025-03-11 ENCOUNTER — PATIENT OUTREACH (OUTPATIENT)
Dept: CARE COORDINATION | Facility: CLINIC | Age: 70
End: 2025-03-11
Payer: COMMERCIAL

## 2025-03-25 ENCOUNTER — PATIENT OUTREACH (OUTPATIENT)
Dept: CARE COORDINATION | Facility: CLINIC | Age: 70
End: 2025-03-25
Payer: COMMERCIAL

## 2025-04-12 ENCOUNTER — HEALTH MAINTENANCE LETTER (OUTPATIENT)
Age: 70
End: 2025-04-12

## 2025-04-23 ENCOUNTER — TELEPHONE (OUTPATIENT)
Dept: FAMILY MEDICINE | Facility: CLINIC | Age: 70
End: 2025-04-23
Payer: COMMERCIAL

## 2025-04-23 NOTE — TELEPHONE ENCOUNTER
Patient Quality Outreach    Patient is due for the following:   Physical Annual Wellness Visit    Action(s) Taken:   Called and left message    Type of outreach:    Phone, left message for patient/parent to call back.    2nd attempt, no answer. Left voice message to return call.  SPENCER Be MA 5/8 25 9:22 AM      Questions for provider review:    None         Marisol Be, [facility]  Chart routed to me.

## 2025-05-05 ENCOUNTER — PATIENT OUTREACH (OUTPATIENT)
Dept: CARE COORDINATION | Facility: CLINIC | Age: 70
End: 2025-05-05
Payer: COMMERCIAL

## 2025-05-26 SDOH — HEALTH STABILITY: PHYSICAL HEALTH: ON AVERAGE, HOW MANY MINUTES DO YOU ENGAGE IN EXERCISE AT THIS LEVEL?: 30 MIN

## 2025-05-26 SDOH — HEALTH STABILITY: PHYSICAL HEALTH: ON AVERAGE, HOW MANY DAYS PER WEEK DO YOU ENGAGE IN MODERATE TO STRENUOUS EXERCISE (LIKE A BRISK WALK)?: 3 DAYS

## 2025-05-26 ASSESSMENT — SOCIAL DETERMINANTS OF HEALTH (SDOH): HOW OFTEN DO YOU GET TOGETHER WITH FRIENDS OR RELATIVES?: PATIENT DECLINED

## 2025-05-27 ENCOUNTER — OFFICE VISIT (OUTPATIENT)
Dept: FAMILY MEDICINE | Facility: CLINIC | Age: 70
End: 2025-05-27
Payer: COMMERCIAL

## 2025-05-27 ENCOUNTER — RESULTS FOLLOW-UP (OUTPATIENT)
Dept: FAMILY MEDICINE | Facility: CLINIC | Age: 70
End: 2025-05-27

## 2025-05-27 VITALS
HEIGHT: 60 IN | DIASTOLIC BLOOD PRESSURE: 66 MMHG | RESPIRATION RATE: 12 BRPM | OXYGEN SATURATION: 98 % | TEMPERATURE: 98.7 F | HEART RATE: 105 BPM | WEIGHT: 166 LBS | BODY MASS INDEX: 32.59 KG/M2 | SYSTOLIC BLOOD PRESSURE: 124 MMHG

## 2025-05-27 DIAGNOSIS — R73.01 IMPAIRED FASTING GLUCOSE: ICD-10-CM

## 2025-05-27 DIAGNOSIS — M16.11 PRIMARY OSTEOARTHRITIS OF RIGHT HIP: ICD-10-CM

## 2025-05-27 DIAGNOSIS — I10 HYPERTENSION WITH GOAL BLOOD PRESSURE LESS THAN 140/90: ICD-10-CM

## 2025-05-27 DIAGNOSIS — E78.5 HYPERLIPIDEMIA LDL GOAL <130: ICD-10-CM

## 2025-05-27 DIAGNOSIS — Z00.00 ENCOUNTER FOR MEDICARE ANNUAL WELLNESS EXAM: Primary | ICD-10-CM

## 2025-05-27 DIAGNOSIS — E66.811 OBESITY (BMI 30.0-34.9): ICD-10-CM

## 2025-05-27 LAB
ANION GAP SERPL CALCULATED.3IONS-SCNC: 14 MMOL/L (ref 7–15)
BUN SERPL-MCNC: 14.9 MG/DL (ref 8–23)
CALCIUM SERPL-MCNC: 9.8 MG/DL (ref 8.8–10.4)
CHLORIDE SERPL-SCNC: 103 MMOL/L (ref 98–107)
CHOLEST SERPL-MCNC: 194 MG/DL
CREAT SERPL-MCNC: 0.56 MG/DL (ref 0.51–0.95)
EGFRCR SERPLBLD CKD-EPI 2021: >90 ML/MIN/1.73M2
ERYTHROCYTE [DISTWIDTH] IN BLOOD BY AUTOMATED COUNT: 11.7 % (ref 10–15)
EST. AVERAGE GLUCOSE BLD GHB EST-MCNC: 120 MG/DL
FASTING STATUS PATIENT QL REPORTED: YES
FASTING STATUS PATIENT QL REPORTED: YES
GLUCOSE SERPL-MCNC: 118 MG/DL (ref 70–99)
HBA1C MFR BLD: 5.8 % (ref 0–5.6)
HCO3 SERPL-SCNC: 23 MMOL/L (ref 22–29)
HCT VFR BLD AUTO: 41.3 % (ref 35–47)
HDLC SERPL-MCNC: 56 MG/DL
HGB BLD-MCNC: 13.5 G/DL (ref 11.7–15.7)
LDLC SERPL CALC-MCNC: 103 MG/DL
MCH RBC QN AUTO: 30.5 PG (ref 26.5–33)
MCHC RBC AUTO-ENTMCNC: 32.7 G/DL (ref 31.5–36.5)
MCV RBC AUTO: 93 FL (ref 78–100)
NONHDLC SERPL-MCNC: 138 MG/DL
PLATELET # BLD AUTO: 468 10E3/UL (ref 150–450)
POTASSIUM SERPL-SCNC: 4.4 MMOL/L (ref 3.4–5.3)
RBC # BLD AUTO: 4.42 10E6/UL (ref 3.8–5.2)
SODIUM SERPL-SCNC: 140 MMOL/L (ref 135–145)
TRIGL SERPL-MCNC: 173 MG/DL
WBC # BLD AUTO: 5.6 10E3/UL (ref 4–11)

## 2025-05-27 PROCEDURE — 90480 ADMN SARSCOV2 VAC 1/ONLY CMP: CPT | Performed by: FAMILY MEDICINE

## 2025-05-27 PROCEDURE — 99213 OFFICE O/P EST LOW 20 MIN: CPT | Mod: 25 | Performed by: FAMILY MEDICINE

## 2025-05-27 PROCEDURE — 80061 LIPID PANEL: CPT | Performed by: FAMILY MEDICINE

## 2025-05-27 PROCEDURE — 3078F DIAST BP <80 MM HG: CPT | Performed by: FAMILY MEDICINE

## 2025-05-27 PROCEDURE — 91320 SARSCV2 VAC 30MCG TRS-SUC IM: CPT | Performed by: FAMILY MEDICINE

## 2025-05-27 PROCEDURE — 1126F AMNT PAIN NOTED NONE PRSNT: CPT | Performed by: FAMILY MEDICINE

## 2025-05-27 PROCEDURE — G0439 PPPS, SUBSEQ VISIT: HCPCS | Performed by: FAMILY MEDICINE

## 2025-05-27 PROCEDURE — 3074F SYST BP LT 130 MM HG: CPT | Performed by: FAMILY MEDICINE

## 2025-05-27 PROCEDURE — 36415 COLL VENOUS BLD VENIPUNCTURE: CPT | Performed by: FAMILY MEDICINE

## 2025-05-27 PROCEDURE — 80048 BASIC METABOLIC PNL TOTAL CA: CPT | Performed by: FAMILY MEDICINE

## 2025-05-27 PROCEDURE — 85027 COMPLETE CBC AUTOMATED: CPT | Performed by: FAMILY MEDICINE

## 2025-05-27 PROCEDURE — 83036 HEMOGLOBIN GLYCOSYLATED A1C: CPT | Performed by: FAMILY MEDICINE

## 2025-05-27 PROCEDURE — G2211 COMPLEX E/M VISIT ADD ON: HCPCS | Performed by: FAMILY MEDICINE

## 2025-05-27 RX ORDER — AMLODIPINE BESYLATE 10 MG/1
10 TABLET ORAL DAILY
Qty: 90 TABLET | Refills: 3 | Status: SHIPPED | OUTPATIENT
Start: 2025-05-27

## 2025-05-27 RX ORDER — ROSUVASTATIN CALCIUM 10 MG/1
10 TABLET, COATED ORAL DAILY
Qty: 90 TABLET | Refills: 3 | Status: SHIPPED | OUTPATIENT
Start: 2025-05-27

## 2025-05-27 ASSESSMENT — PAIN SCALES - GENERAL: PAINLEVEL_OUTOF10: NO PAIN (0)

## 2025-05-27 NOTE — PROGRESS NOTES
"Preventive Care Visit  [facility]  Albert Grijalva MD, Family Medicine  May 27, 2025      Assessment & Plan       ICD-10-CM    1. Encounter for Medicare annual wellness exam  Z00.00       2. Hyperlipidemia LDL goal <130  E78.5 Lipid panel reflex to direct LDL Fasting     rosuvastatin (CRESTOR) 10 MG tablet      3. Hypertension with goal blood pressure less than 140/90  I10 BASIC METABOLIC PANEL     amLODIPine (NORVASC) 10 MG tablet     CBC with platelets      4. Impaired fasting glucose  R73.01 Hemoglobin A1c      5. Primary osteoarthritis of right hip  M16.11       6. Obesity (BMI 30.0-34.9)  E66.811         Blood pressure and other vital signs look acceptable  We discussed the above items  We will check fasting lab work today as above  We will plan to continue her same medications  We will give her a COVID booster dose today  Plan a tentative recheck in 1 year, or sooner prn    The longitudinal plan of care for the diagnosis(es)/condition(s) as documented were addressed during this visit. Due to the added complexity in care, I will continue to support Cynthia in the subsequent management and with ongoing continuity of care.      BMI  Estimated body mass index is 32.08 kg/m  as calculated from the following:    Height as of this encounter: 1.532 m (5' 0.32\").    Weight as of this encounter: 75.3 kg (166 lb).   Weight management plan: Discussed healthy diet and exercise guidelines    Counseling  Appropriate preventive services were addressed with this patient via screening, questionnaire, or discussion as appropriate for fall prevention, nutrition, physical activity, Tobacco-use cessation, social engagement, weight loss and cognition.  Checklist reviewing preventive services available has been given to the patient.  Reviewed patient's diet, addressing concerns and/or questions.   She is at risk for lack of exercise and has been provided with information to increase physical activity for the " benefit of her well-being.         Jerica Marie is a 70 year old, presenting for the following:  Physical        5/27/2025     7:56 AM   Additional Questions   Roomed by cam   Accompanied by none          HPI    She comes in for an annual physical as well as follow-up on baseline health conditions.  She remains on amlodipine for hypertension and rosuvastatin for hyperlipidemia.  She is fasting today for lab work.  She generally feels well.  She does still have ongoing right hip pain from arthritis.  She does not want to have any surgery on that while she is still working, and she is still working.    Advance Care Planning    Discussed advance care planning with patient; however, patient declined at this time.        5/26/2025   General Health   How would you rate your overall physical health? Good   Feel stress (tense, anxious, or unable to sleep) Only a little   (!) STRESS CONCERN      5/26/2025   Nutrition   Diet: Regular (no restrictions)         5/26/2025   Exercise   Days per week of moderate/strenous exercise 3 days   Average minutes spent exercising at this level 30 min         5/26/2025   Social Factors   Frequency of gathering with friends or relatives Patient declined   Worry food won't last until get money to buy more No   Food not last or not have enough money for food? No   Do you have housing? (Housing is defined as stable permanent housing and does not include staying outside in a car, in a tent, in an abandoned building, in an overnight shelter, or couch-surfing.) Yes   Are you worried about losing your housing? No   Lack of transportation? No   Unable to get utilities (heat,electricity)? No         5/26/2025   Fall Risk   Fallen 2 or more times in the past year? No   Trouble with walking or balance? No          5/26/2025   Activities of Daily Living- Home Safety   Needs help with the following daily activites None of the above   Safety concerns in the home None of the above         5/26/2025    Dental   Dentist two times every year? (!) DECLINE         5/26/2025   Hearing Screening   Hearing concerns? None of the above         5/26/2025   Driving Risk Screening   Patient/family members have concerns about driving No         5/26/2025   General Alertness/Fatigue Screening   Have you been more tired than usual lately? No         5/26/2025   Urinary Incontinence Screening   Bothered by leaking urine in past 6 months No         Today's PHQ-2 Score:       5/26/2025     9:15 AM   PHQ-2 ( 1999 Pf[employer])   Q1: Little interest or pleasure in doing things 0   Q2: Feeling down, depressed or hopeless 0   PHQ-2 Score 0    Q1: Little interest or pleasure in doing things Not at all   Q2: Feeling down, depressed or hopeless Not at all   PHQ-2 Score 0       Patient-reported           5/26/2025   Substance Use   Alcohol more than 3/day or more than 7/wk No   Do you have a current opioid prescription? No   How severe/bad is pain from 1 to 10? 4/10   Do you use any other substances recreationally? No     Social History     Tobacco Use    Smoking status: Never     Passive exposure: Never    Smokeless tobacco: Never   Vaping Use    Vaping status: Never Used   Substance Use Topics    Alcohol use: Yes     Comment: 7-14/ week, wine    Drug use: No           2/21/2025   LAST FHS-7 RESULTS   1st degree relative breast or ovarian cancer No   Any relative bilateral breast cancer No   Any male have breast cancer No   Any ONE woman have BOTH breast AND ovarian cancer No   Any woman with breast cancer before 50yrs No   2 or more relatives with breast AND/OR ovarian cancer No   2 or more relatives with breast AND/OR bowel cancer No        Mammogram Screening - Mammogram every 1-2 years updated in Health Maintenance based on mutual decision making      History of abnormal Pap smear: No - age 65 or older with adequate negative prior screening test results (3 consecutive negative cytology results, 2 consecutive negative cotesting results, or  2 consecutive negative HrHPV test results within 10 years, with the most recent test occurring within the recommended screening interval for the test used)        Latest Ref Rng & Units 8/9/2018     3:16 PM 8/9/2018     3:00 PM   PAP / HPV   PAP (Historical)  NIL     HPV 16 DNA NEG^Negative  Negative    HPV 18 DNA NEG^Negative  Negative    Other HR HPV NEG^Negative  Negative      ASCVD Risk   The 10-year ASCVD risk score (Pedro NAVARRO, et al., 2019) is: 9%    Values used to calculate the score:      Age: 70 years      Sex: Female      Is Non- : No      Diabetic: No      Tobacco smoker: No      Systolic Blood Pressure: 124 mmHg      Is BP treated: Yes      HDL Cholesterol: 54 mg/dL      Total Cholesterol: 168 mg/dL            Reviewed and updated as needed this visit by Provider                    Patient Active Problem List   Diagnosis    Hyperlipidemia LDL goal <130    Impaired fasting glucose    Hypertension with goal blood pressure less than 140/90    Primary osteoarthritis of right hip    Obesity (BMI 30.0-34.9)    Left knee pain, unspecified chronicity    Osteopenia of multiple sites    History of colonic polyps     No past surgical history on file.    Social History     Tobacco Use    Smoking status: Never     Passive exposure: Never    Smokeless tobacco: Never   Substance Use Topics    Alcohol use: Yes     Comment: 7-14/ week, wine     Family History   Problem Relation Age of Onset    Prostate Cancer Father     Hyperlipidemia Brother     Prostate Cancer Brother          Current Outpatient Medications   Medication Sig Dispense Refill    acetaminophen (TYLENOL) 325 MG tablet Take 325-650 mg by mouth every 6 hours as needed for mild pain      amLODIPine (NORVASC) 10 MG tablet Take 1 tablet (10 mg) by mouth daily. 90 tablet 3    rosuvastatin (CRESTOR) 10 MG tablet Take 1 tablet (10 mg) by mouth daily. 90 tablet 3    Cholecalciferol (VITAMIN D3) 25 MCG (1000 UT) CAPS  (Patient not  "taking: Reported on 5/27/2025)       Allergies   Allergen Reactions    Pcn [Penicillins]      Current providers sharing in care for this patient include:  Patient Care Team:  Albert Grijalva MD as PCP - General (Family Practice)  Albert Griajlva MD as Assigned PCP    The following health maintenance items are reviewed in Epic and correct as of today:  Health Maintenance   Topic Date Due    MEDICARE ANNUAL WELLNESS VISIT  04/10/2025    BMP  04/10/2025    LIPID  04/10/2025    ANNUAL REVIEW OF HM ORDERS  04/10/2025    COVID-19 Vaccine (8 - 2024-25 season) 04/15/2025    FALL RISK ASSESSMENT  05/27/2026    MAMMO SCREENING  02/21/2027    DIABETES SCREENING  04/10/2027    DTAP/TDAP/TD IMMUNIZATION (2 - Td or Tdap) 08/09/2028    ADVANCE CARE PLANNING  04/10/2029    COLORECTAL CANCER SCREENING  08/30/2029    RSV VACCINE (1 - 1-dose 75+ series) 05/19/2032    DEXA  03/07/2038    HEPATITIS C SCREENING  Completed    PHQ-2 (once per calendar year)  Completed    INFLUENZA VACCINE  Completed    Pneumococcal Vaccine: 50+ Years  Completed    ZOSTER IMMUNIZATION  Completed    HPV IMMUNIZATION  Aged Out    MENINGITIS IMMUNIZATION  Aged Out    PAP  Discontinued         Review of Systems  Mainly significant for the right hip pain with activity.  Review of systems is otherwise noncontributory.     Objective    Exam  /66 (BP Location: Left arm, Patient Position: Chair, Cuff Size: Adult Regular)   Pulse 105   Temp 98.7  F (37.1  C) (Temporal)   Resp 12   Ht 1.532 m (5' 0.32\")   Wt 75.3 kg (166 lb)   SpO2 98%   BMI 32.08 kg/m     Estimated body mass index is 32.08 kg/m  as calculated from the following:    Height as of this encounter: 1.532 m (5' 0.32\").    Weight as of this encounter: 75.3 kg (166 lb).    Physical Exam  GENERAL: alert and no distress  EYES: Eyes grossly normal to inspection, PERRL and conjunctivae and sclerae normal  HENT: Grossly normal  NECK: no adenopathy, no asymmetry, masses, or scars  RESP: lungs clear " to auscultation - no rales, rhonchi or wheezes  CV: regular rate and rhythm, normal S1 S2, no S3 or S4, no murmur, click or rub, no peripheral edema  ABDOMEN: soft, nontender, no hepatosplenomegaly, no masses  MS: no gross musculoskeletal defects noted, no edema  SKIN: no suspicious lesions or rashes  NEURO: Normal strength and tone, mentation intact and speech normal  PSYCH: mentation appears normal, affect normal/bright        5/27/2025   Mini Cog   Clock Draw Score 2 Normal   3 Item Recall 3 objects recalled   Mini Cog Total Score 5              Signed Electronically by: Albert Grijalva MD

## 2025-05-27 NOTE — RESULT ENCOUNTER NOTE
Cynthia,  Your laboratory tests show that your electrolytes and kidney tests remain nice and normal.  Blood sugar is still running a bit high, but not into a diabetes range.  Lipid values look decent, but not quite as good as last year.  Blood counts generally look fine.  Please continue your same medications.  I would advise another recheck in 1 year.    Albert Grijalva MD